# Patient Record
Sex: FEMALE | Race: WHITE | Employment: OTHER | URBAN - METROPOLITAN AREA
[De-identification: names, ages, dates, MRNs, and addresses within clinical notes are randomized per-mention and may not be internally consistent; named-entity substitution may affect disease eponyms.]

---

## 2017-01-10 PROBLEM — M76.30 IT BAND SYNDROME: Status: ACTIVE | Noted: 2017-01-10

## 2017-03-11 PROBLEM — M76.30 IT BAND SYNDROME: Status: RESOLVED | Noted: 2017-01-10 | Resolved: 2017-03-11

## 2018-03-14 PROBLEM — G25.81 RESTLESS LEGS: Status: ACTIVE | Noted: 2018-03-14

## 2018-03-14 PROBLEM — E78.2 HYPERCHOLESTEROLEMIA WITH HYPERTRIGLYCERIDEMIA: Status: ACTIVE | Noted: 2018-03-14

## 2018-12-20 PROBLEM — E16.2 HYPOGLYCEMIA: Status: ACTIVE | Noted: 2018-12-20

## 2019-02-15 PROBLEM — L40.4 GUTTATE PSORIASIS: Status: ACTIVE | Noted: 2019-02-15

## 2019-02-15 PROBLEM — N20.0 CALCIUM OXALATE STONES: Status: ACTIVE | Noted: 2019-02-15

## 2019-05-21 PROBLEM — L93.2 CUTANEOUS LUPUS ERYTHEMATOSUS: Status: ACTIVE | Noted: 2019-05-21

## 2019-05-21 PROBLEM — L40.4 GUTTATE PSORIASIS: Status: RESOLVED | Noted: 2019-02-15 | Resolved: 2019-05-21

## 2019-08-19 PROBLEM — C44.310 BASAL CELL CARCINOMA (BCC) OF SKIN OF FACE: Status: ACTIVE | Noted: 2019-08-19

## 2021-03-15 PROBLEM — N32.81 OAB (OVERACTIVE BLADDER): Status: ACTIVE | Noted: 2021-03-15

## 2022-05-25 PROBLEM — D12.6 ADENOMATOUS POLYP OF COLON: Status: ACTIVE | Noted: 2022-05-25

## 2023-03-27 ENCOUNTER — HOSPITAL ENCOUNTER (OUTPATIENT)
Age: 78
Setting detail: SPECIMEN
Discharge: HOME | End: 2023-03-27

## 2023-03-28 LAB
MICROORGANISM SPEC CULT: NORMAL
SPECIMEN DESCRIPTION: NORMAL

## 2023-05-03 NOTE — PROGRESS NOTES
DAY OF SURGERY/PROCEDURE  GUIDELINES    As a patient at the [facility], you can expect quality medical and nursing care that is centered on your individual needs. It is our goal to make your surgical experience as comfortable and excellent as possible.  ________________________________________________________________________    The following instructions are general guidelines, if any information on this sheet is different from what your doctor has instructed you to do, please follow your doctor's instructions. Please arrive on 5/5 @ 745 am      Enter through entrance C. Check in at registration     Upon arrival you will be taken to the pre-operative area to get ready for surgery, your family will stay in the waiting room and visit with you once you are ready for surgery. Due to special limitations please limit visitation to 1-2 members of your family at a time. When it is time for surgery your family will return to the waiting room. Nothing to eat, drink, smoke, suck or chew after midnight (no water, gum, mints, cigarettes, cigars, pipes, snuff, chewing tobacco, etc.) or your surgery may be canceled. Take a shower or bath on the morning of your surgery/procedure (Hibiclens if directed) Do not apply any lotions. Brush your teeth, but do not swallow any water    IN CASE OF ILLNESS - If you have a cold or flu symptoms (high fever, runny nose, sore throat, cough, etc.) rash, nausea, vomiting, loose stools, and/or recent contact with someone who has a contagious disease (chick pox, measles, etc.) please call your doctor before coming to the surgery center    Take a small sip of water with heart, blood pressure, and/or seizure medication the morning of surgery. DO NOT take anticoagulants (blood thinners, aspirin or aspirin-containing products) as instructed by your physician. Leave all jewelry at home and wear loose, comfortable clothing that is easy to put on and take off.      If

## 2023-05-04 ENCOUNTER — ANESTHESIA EVENT (OUTPATIENT)
Dept: OPERATING ROOM | Age: 78
End: 2023-05-04
Payer: COMMERCIAL

## 2023-05-05 ENCOUNTER — APPOINTMENT (OUTPATIENT)
Dept: GENERAL RADIOLOGY | Age: 78
End: 2023-05-05
Attending: SPECIALIST
Payer: COMMERCIAL

## 2023-05-05 ENCOUNTER — ANESTHESIA (OUTPATIENT)
Dept: OPERATING ROOM | Age: 78
End: 2023-05-05
Payer: COMMERCIAL

## 2023-05-05 ENCOUNTER — HOSPITAL ENCOUNTER (OUTPATIENT)
Age: 78
Setting detail: OUTPATIENT SURGERY
Discharge: HOME | End: 2023-05-05
Attending: SPECIALIST | Admitting: SPECIALIST
Payer: COMMERCIAL

## 2023-05-05 VITALS
HEART RATE: 91 BPM | RESPIRATION RATE: 17 BRPM | WEIGHT: 141.8 LBS | SYSTOLIC BLOOD PRESSURE: 136 MMHG | HEIGHT: 64 IN | DIASTOLIC BLOOD PRESSURE: 76 MMHG | TEMPERATURE: 96.8 F | BODY MASS INDEX: 24.21 KG/M2 | OXYGEN SATURATION: 97 %

## 2023-05-05 PROCEDURE — 6360000002 HC RX W HCPCS: Performed by: SPECIALIST

## 2023-05-05 PROCEDURE — 6360000002 HC RX W HCPCS: Performed by: NURSE ANESTHETIST, CERTIFIED REGISTERED

## 2023-05-05 PROCEDURE — 6370000000 HC RX 637 (ALT 250 FOR IP)

## 2023-05-05 PROCEDURE — 3600000004 HC SURGERY LEVEL 4 BASE: Performed by: SPECIALIST

## 2023-05-05 PROCEDURE — 6360000002 HC RX W HCPCS

## 2023-05-05 PROCEDURE — 7100000001 HC PACU RECOVERY - ADDTL 15 MIN: Performed by: SPECIALIST

## 2023-05-05 PROCEDURE — 7100000000 HC PACU RECOVERY - FIRST 15 MIN: Performed by: SPECIALIST

## 2023-05-05 PROCEDURE — 7100000010 HC PHASE II RECOVERY - FIRST 15 MIN: Performed by: SPECIALIST

## 2023-05-05 PROCEDURE — 2580000003 HC RX 258: Performed by: ANESTHESIOLOGY

## 2023-05-05 PROCEDURE — C2617 STENT, NON-COR, TEM W/O DEL: HCPCS | Performed by: SPECIALIST

## 2023-05-05 PROCEDURE — 3700000000 HC ANESTHESIA ATTENDED CARE: Performed by: SPECIALIST

## 2023-05-05 PROCEDURE — 3600000014 HC SURGERY LEVEL 4 ADDTL 15MIN: Performed by: SPECIALIST

## 2023-05-05 PROCEDURE — 2720000010 HC SURG SUPPLY STERILE: Performed by: SPECIALIST

## 2023-05-05 PROCEDURE — 3209999900 FLUORO FOR SURGICAL PROCEDURES

## 2023-05-05 PROCEDURE — 3700000001 HC ADD 15 MINUTES (ANESTHESIA): Performed by: SPECIALIST

## 2023-05-05 PROCEDURE — 2709999900 HC NON-CHARGEABLE SUPPLY: Performed by: SPECIALIST

## 2023-05-05 PROCEDURE — 7100000011 HC PHASE II RECOVERY - ADDTL 15 MIN: Performed by: SPECIALIST

## 2023-05-05 PROCEDURE — 2500000003 HC RX 250 WO HCPCS: Performed by: NURSE ANESTHETIST, CERTIFIED REGISTERED

## 2023-05-05 DEVICE — URETERAL STENT
Type: IMPLANTABLE DEVICE | Site: URETER | Status: FUNCTIONAL
Brand: POLARIS™ ULTRA

## 2023-05-05 RX ORDER — FENTANYL CITRATE 50 UG/ML
INJECTION, SOLUTION INTRAMUSCULAR; INTRAVENOUS PRN
Status: DISCONTINUED | OUTPATIENT
Start: 2023-05-05 | End: 2023-05-05 | Stop reason: SDUPTHER

## 2023-05-05 RX ORDER — OXYBUTYNIN CHLORIDE 10 MG/1
10 TABLET, EXTENDED RELEASE ORAL 2 TIMES DAILY PRN
Qty: 10 TABLET | Refills: 1 | Status: SHIPPED | OUTPATIENT
Start: 2023-05-05 | End: 2023-05-11

## 2023-05-05 RX ORDER — SODIUM CHLORIDE 9 MG/ML
25 INJECTION, SOLUTION INTRAVENOUS PRN
Status: DISCONTINUED | OUTPATIENT
Start: 2023-05-05 | End: 2023-05-05 | Stop reason: HOSPADM

## 2023-05-05 RX ORDER — DEXAMETHASONE SODIUM PHOSPHATE 10 MG/ML
INJECTION, SOLUTION INTRAMUSCULAR; INTRAVENOUS PRN
Status: DISCONTINUED | OUTPATIENT
Start: 2023-05-05 | End: 2023-05-05 | Stop reason: SDUPTHER

## 2023-05-05 RX ORDER — METOCLOPRAMIDE HYDROCHLORIDE 5 MG/ML
10 INJECTION INTRAMUSCULAR; INTRAVENOUS
Status: DISCONTINUED | OUTPATIENT
Start: 2023-05-05 | End: 2023-05-05 | Stop reason: HOSPADM

## 2023-05-05 RX ORDER — SODIUM CHLORIDE, SODIUM LACTATE, POTASSIUM CHLORIDE, CALCIUM CHLORIDE 600; 310; 30; 20 MG/100ML; MG/100ML; MG/100ML; MG/100ML
INJECTION, SOLUTION INTRAVENOUS CONTINUOUS
Status: DISCONTINUED | OUTPATIENT
Start: 2023-05-05 | End: 2023-05-05 | Stop reason: HOSPADM

## 2023-05-05 RX ORDER — OXYCODONE HYDROCHLORIDE 5 MG/1
10 TABLET ORAL PRN
Status: DISCONTINUED | OUTPATIENT
Start: 2023-05-05 | End: 2023-05-05 | Stop reason: HOSPADM

## 2023-05-05 RX ORDER — ONDANSETRON 2 MG/ML
4 INJECTION INTRAMUSCULAR; INTRAVENOUS
Status: COMPLETED | OUTPATIENT
Start: 2023-05-05 | End: 2023-05-05

## 2023-05-05 RX ORDER — GENTAMICIN SULFATE 80 MG/50ML
80 INJECTION, SOLUTION INTRAVENOUS ONCE
Status: COMPLETED | OUTPATIENT
Start: 2023-05-05 | End: 2023-05-05

## 2023-05-05 RX ORDER — SODIUM CHLORIDE 9 MG/ML
INJECTION, SOLUTION INTRAVENOUS PRN
Status: DISCONTINUED | OUTPATIENT
Start: 2023-05-05 | End: 2023-05-05 | Stop reason: HOSPADM

## 2023-05-05 RX ORDER — HYDRALAZINE HYDROCHLORIDE 20 MG/ML
10 INJECTION INTRAMUSCULAR; INTRAVENOUS
Status: DISCONTINUED | OUTPATIENT
Start: 2023-05-05 | End: 2023-05-05 | Stop reason: HOSPADM

## 2023-05-05 RX ORDER — DIPHENHYDRAMINE HYDROCHLORIDE 50 MG/ML
12.5 INJECTION INTRAMUSCULAR; INTRAVENOUS
Status: DISCONTINUED | OUTPATIENT
Start: 2023-05-05 | End: 2023-05-05 | Stop reason: HOSPADM

## 2023-05-05 RX ORDER — SODIUM CHLORIDE 0.9 % (FLUSH) 0.9 %
5-40 SYRINGE (ML) INJECTION EVERY 12 HOURS SCHEDULED
Status: DISCONTINUED | OUTPATIENT
Start: 2023-05-05 | End: 2023-05-05 | Stop reason: HOSPADM

## 2023-05-05 RX ORDER — SODIUM CHLORIDE 0.9 % (FLUSH) 0.9 %
5-40 SYRINGE (ML) INJECTION PRN
Status: DISCONTINUED | OUTPATIENT
Start: 2023-05-05 | End: 2023-05-05 | Stop reason: HOSPADM

## 2023-05-05 RX ORDER — OXYCODONE HYDROCHLORIDE 5 MG/1
5 TABLET ORAL PRN
Status: DISCONTINUED | OUTPATIENT
Start: 2023-05-05 | End: 2023-05-05 | Stop reason: HOSPADM

## 2023-05-05 RX ORDER — PROPOFOL 10 MG/ML
INJECTION, EMULSION INTRAVENOUS PRN
Status: DISCONTINUED | OUTPATIENT
Start: 2023-05-05 | End: 2023-05-05 | Stop reason: SDUPTHER

## 2023-05-05 RX ORDER — ONDANSETRON 2 MG/ML
INJECTION INTRAMUSCULAR; INTRAVENOUS PRN
Status: DISCONTINUED | OUTPATIENT
Start: 2023-05-05 | End: 2023-05-05 | Stop reason: SDUPTHER

## 2023-05-05 RX ORDER — ONDANSETRON 2 MG/ML
INJECTION INTRAMUSCULAR; INTRAVENOUS
Status: COMPLETED
Start: 2023-05-05 | End: 2023-05-05

## 2023-05-05 RX ORDER — LIDOCAINE HYDROCHLORIDE 10 MG/ML
1 INJECTION, SOLUTION INFILTRATION; PERINEURAL
Status: DISCONTINUED | OUTPATIENT
Start: 2023-05-05 | End: 2023-05-05 | Stop reason: HOSPADM

## 2023-05-05 RX ORDER — GENTAMICIN SULFATE 80 MG/50ML
INJECTION, SOLUTION INTRAVENOUS
Status: COMPLETED
Start: 2023-05-05 | End: 2023-05-05

## 2023-05-05 RX ORDER — PHENYLEPHRINE HCL IN 0.9% NACL 1 MG/10 ML
SYRINGE (ML) INTRAVENOUS PRN
Status: DISCONTINUED | OUTPATIENT
Start: 2023-05-05 | End: 2023-05-05 | Stop reason: SDUPTHER

## 2023-05-05 RX ORDER — KETOROLAC TROMETHAMINE 30 MG/ML
INJECTION, SOLUTION INTRAMUSCULAR; INTRAVENOUS PRN
Status: DISCONTINUED | OUTPATIENT
Start: 2023-05-05 | End: 2023-05-05 | Stop reason: SDUPTHER

## 2023-05-05 RX ADMIN — GENTAMICIN SULFATE 80 MG: 80 INJECTION, SOLUTION INTRAVENOUS at 09:20

## 2023-05-05 RX ADMIN — ONDANSETRON 4 MG: 2 INJECTION INTRAMUSCULAR; INTRAVENOUS at 09:22

## 2023-05-05 RX ADMIN — ONDANSETRON 4 MG: 2 INJECTION INTRAMUSCULAR; INTRAVENOUS at 11:16

## 2023-05-05 RX ADMIN — KETOROLAC TROMETHAMINE 15 MG: 30 INJECTION, SOLUTION INTRAMUSCULAR at 10:01

## 2023-05-05 RX ADMIN — HYOSCYAMINE SULFATE 125 MCG: 0.12 TABLET ORAL at 11:28

## 2023-05-05 RX ADMIN — PROPOFOL 100 MG: 10 INJECTION, EMULSION INTRAVENOUS at 09:18

## 2023-05-05 RX ADMIN — FENTANYL CITRATE 50 MCG: 50 INJECTION, SOLUTION INTRAMUSCULAR; INTRAVENOUS at 09:18

## 2023-05-05 RX ADMIN — FENTANYL CITRATE 50 MCG: 50 INJECTION, SOLUTION INTRAMUSCULAR; INTRAVENOUS at 09:24

## 2023-05-05 RX ADMIN — Medication 50 MCG: at 09:30

## 2023-05-05 RX ADMIN — FENTANYL CITRATE 50 MCG: 50 INJECTION, SOLUTION INTRAMUSCULAR; INTRAVENOUS at 10:03

## 2023-05-05 RX ADMIN — SODIUM CHLORIDE, POTASSIUM CHLORIDE, SODIUM LACTATE AND CALCIUM CHLORIDE: 600; 310; 30; 20 INJECTION, SOLUTION INTRAVENOUS at 08:31

## 2023-05-05 RX ADMIN — DEXAMETHASONE SODIUM PHOSPHATE 5 MG: 10 INJECTION, SOLUTION INTRAMUSCULAR; INTRAVENOUS at 09:22

## 2023-05-05 RX ADMIN — Medication 125 MCG: at 11:28

## 2023-05-05 RX ADMIN — PROPOFOL 50 MG: 10 INJECTION, EMULSION INTRAVENOUS at 09:22

## 2023-05-05 RX ADMIN — Medication 100 MCG: at 09:32

## 2023-05-05 RX ADMIN — FENTANYL CITRATE 50 MCG: 50 INJECTION, SOLUTION INTRAMUSCULAR; INTRAVENOUS at 10:12

## 2023-05-05 ASSESSMENT — PAIN SCALES - GENERAL
PAINLEVEL_OUTOF10: 0
PAINLEVEL_OUTOF10: 3
PAINLEVEL_OUTOF10: 0
PAINLEVEL_OUTOF10: 5

## 2023-05-05 ASSESSMENT — PAIN - FUNCTIONAL ASSESSMENT: PAIN_FUNCTIONAL_ASSESSMENT: 0-10

## 2023-05-05 NOTE — DISCHARGE INSTRUCTIONS
Activity  You have had anesthesia today  Do not drive, operate heavy equipment, consume alcoholic beverages, or make any important decisions  for 24 hours   If you are taking pain medication: Do not drive or consume alcohol. Take your time changing positions today. You may feel light headed or dizzy if you move too quickly. Continue your home medications as ordered by your physician. Diet   You can eat your normal diet when you feel well. You should start off with bland foods like chicken soup, toast, or yogurt. Then advance as tolerated. Drink plenty of fluids (unless your doctor tells you not to). Your urine should be very lightly colored without a strong odor. Remove stent with string in 5 days. Get a 24 hour urine collection to determine etiology of stone disease in next 2-3 weeks; Dr. Joanne Jerome office will send information about this to you directly. Follow up in Tete Chavez M.D.'s office in 6 weeks with DAREK prior. Patient instructed to drink at least 10 eight ounce glasses of water a day to facilitate passage of any stones. Expect to see intermittent visible blood in urine as long as stent is in place. You may experience increased urgency and frequency of urination and pain (especially at the end of urination) associated with the stent. Take the prescribed medications to help alleviate these symptoms.

## 2023-05-05 NOTE — H&P
Determinants of Health     Financial Resource Strain: Low Risk     Difficulty of Paying Living Expenses: Not hard at all   Food Insecurity: No Food Insecurity    Worried About [address] in the Last Year: Never true    Ran Out of Food in the Last Year: Never true   Transportation Needs: Unknown    Lack of Transportation (Medical): Not on file    Lack of Transportation (Non-Medical): No   Physical Activity: Not on file   Stress: Not on file   Social Connections: Not on file   Intimate Partner Violence: Not on file   Housing Stability: Unknown    Unable to Pay for Housing in the Last Year: Not on file    Number of Places Lived in the Last Year: Not on file    Unstable Housing in the Last Year: No       Family History:    Family History   Problem Relation Age of Onset    Dementia Mother     High Blood Pressure Mother     Stroke Mother     Cancer Father         prostate/bone    Urolithiasis Father     Cancer Brother         bladder    Arthritis Brother     Other Brother         cornea problems. Dementia Maternal Aunt     Heart Disease Paternal Uncle 36        mi    Dementia Maternal Grandmother     Heart Disease Maternal Grandfather 66         mi    Other Son         nephrolithiasis/uric acid    High Cholesterol Daughter        REVIEW OF SYSTEMS:  All reviewed and negative except for pertinent ones listed in HPI. Physical Exam:      No data found. Constitutional: Patient in no acute distress; Neuro: alert and oriented to person place and time. Psych: Mood and affect normal.  Skin: Normal  Lungs: Respiratory effort normal, CTA  Cardiovascular:  Normal peripheral pulses; R3 wo murmur  Abdomen: Soft, non-tender, non-distended with no CVA, flank pain    LABS:   No results for input(s): WBC, HGB, HCT, MCV, PLT in the last 72 hours. No results for input(s): NA, K, CL, CO2, PHOS, BUN, CREATININE, CA in the last 72 hours.   No results found for: PSA    Additional Lab/culture results:    Urinalysis:

## 2023-05-05 NOTE — OP NOTE
Operative Note      Patient: Kim Corral  YOB: 1945  MRN: [id number]    Date of Procedure: 5/5/2023    Pre-Op Diagnosis Codes:     * Left ureteral calculus [N20.1]    Post-Op Diagnosis: Same       Procedure(s):  CYSTOSCOPY LEFT URETEROSCOPY FORTEC HOLMIUM LASER AND LEFT STENT PLACEMENT [id number]    Surgeon(s):  Corona Guthrie MD    Assistant:   * No surgical staff found *    Anesthesia: General    Estimated Blood Loss (mL): Minimal    Complications: None    Specimens:   * No specimens in log *    Implants:  * No implants in log *      Drains: * No LDAs found *    Findings: Patient's proximal ureteral and left renal stones broke up nicely. Detailed Description of Procedure:   INDICATIONS FOR PROCEDURE:  Kim Corral is a 70 y.o. female presents for a left sided ureteral calculus. After the risks, benefits, alternatives, of the procedure were discussed with the patient, informed consent was obtained. The patient elected to proceed. Patient given Gentamicin 80 mg IV on call to OR. DETAILS OF THE PROCEDURE:  The patient was brought back from the preoperative holding area to the  operating suite, and was transferred to the operating table where the patient lay in  supine position. EPC's were in place, connected to the machine and the machine was turned on before induction. General endotracheal anesthesia was induced, and patient was prepped and draped in standard surgical fashion after being placed in dorsolithotomy position. A proper timeout was performed, preoperative antibiotics were given. We began by inserting a cystoscope with a 22 French sheath and 30 degree lens into the patient's urethral meatus and advancing into the bladder without complication. A pan cystoscopy was preformed and the bladder appeared unremarkable. We then focused our attention on the Left ureteral orifice, which we cannulated with our glidewire, advanced up to renal pelvis.   We then used a dual

## 2023-05-05 NOTE — ANESTHESIA PRE PROCEDURE
Department of Anesthesiology  Preprocedure Note       Name:  Abdirashid Rodriguez   Age:  70 y.o.  :  1945                                          MRN:  [id number]         Date:  2023      Surgeon: Celi Taylor):  Liisa Nageotte, MD    Procedure: Procedure(s):  CYSTOSCOPY LEFT URETEROSCOPY FORTEC HOLMIUM LASER AND LEFT STENT PLACEMENT [id number]    Medications prior to admission:   Prior to Admission medications    Medication Sig Start Date End Date Taking? Authorizing Provider   ondansetron (ZOFRAN-ODT) 4 MG disintegrating tablet  5/3/23   Historical Provider, MD   tamsulosin (FLOMAX) 0.4 MG capsule  5/3/23   Historical Provider, MD   ketorolac (TORADOL) 10 MG tablet Take 1 tablet by mouth every 6 hours as needed 23   Historical Provider, MD   lisinopril (PRINIVIL;ZESTRIL) 5 MG tablet Take 1 tablet by mouth daily 23   Char Richard MD   TOVIAZ 8 MG TB24 Take 8 mg by mouth daily 23   Liisa Nageotte, MD   pravastatin (PRAVACHOL) 80 MG tablet TAKE 1 TABLET BY MOUTH EVERY DAY 3/6/23   Char Richard MD   linaclotide [facility]) 145 MCG capsule Take 1 capsule by mouth every morning (before breakfast) 22   Char Richard MD   ammonium lactate (LAC-HYDRIN) 12 % lotion APPLY ONCE DAILY, NECK DOWN, AFTER BATHING,  FOR DRY SKIN 22   Historical Provider, MD   Denosumab (PROLIA SC) Inject into the skin    Historical Provider, MD   Timolol (TIMOPTIC) 0.5 % (DAILY) SOLN ophthalmic solution 1 drop  in the morning and 1 drop in the evening. Indications: both eye.     Historical Provider, MD   Multiple Vitamins-Minerals (ONE-A-DAY WOMENS 50 PLUS PO) Take by mouth     Historical Provider, MD   Omega-3 Fatty Acids (FISH OIL) 1000 MG CPDR Take 1 capsule by mouth daily    Historical Provider, MD   VYZULTA 0.024 % SOLN Place 1 drop into both eyes nightly  3/22/19   Historical Provider, MD   Coenzyme Q10 (COQ10) 200 MG CAPS Take 1 capsule by mouth daily 16   Ray Antunez

## 2023-05-05 NOTE — ANESTHESIA POSTPROCEDURE EVALUATION
POST- ANESTHESIA EVALUATION       Pt Name: Abdirashid Rodriguez  MRN: [id number]  [place]: 1945  Date of evaluation: 5/5/2023  Time:  1:33 PM      /76   Pulse 91   Temp 96.8 °F (36 °C) (Temporal)   Resp 17   Ht 5' 4\" (1.626 m)   Wt 141 lb 12.8 oz (64.3 kg)   SpO2 97%   BMI 24.34 kg/m²      Consciousness Level  Awake  Cardiopulmonary Status  Stable  Pain Adequately Treated YES  Nausea / Vomiting  NO  Adequate Hydration  YES  Anesthesia Related Complications NONE      Electronically signed by Idalmis Harrington MD on 5/5/2023 at 1:33 PM       Department of Anesthesiology  Postprocedure Note    Patient: Abdirashid Rodriguez  MRN: [id number]  [place]: 1945  Date of evaluation: 5/5/2023      Procedure Summary     Date: 05/05/23 Room / Location: [address]    Anesthesia Start: 1970 Anesthesia Stop: 6781    Procedure: CYSTOSCOPY LEFT URETEROSCOPY FORTEC HOLMIUM LASER AND LEFT STENT PLACEMENT [id number] (Left) Diagnosis:       Left ureteral calculus      (Left ureteral calculus [N20.1])    Surgeons: Liisa Nageotte, MD Responsible Provider: Idalmis Harrington MD    Anesthesia Type: general ASA Status: 3          Anesthesia Type: No value filed.     Tony Phase I: Tony Score: 10    Tony Phase II:        Anesthesia Post Evaluation

## 2023-05-18 ENCOUNTER — ANESTHESIA EVENT (OUTPATIENT)
Dept: OPERATING ROOM | Age: 78
End: 2023-05-18
Payer: COMMERCIAL

## 2023-05-19 ENCOUNTER — ANESTHESIA (OUTPATIENT)
Dept: OPERATING ROOM | Age: 78
End: 2023-05-19
Payer: COMMERCIAL

## 2023-05-19 ENCOUNTER — APPOINTMENT (OUTPATIENT)
Dept: GENERAL RADIOLOGY | Age: 78
End: 2023-05-19
Attending: SPECIALIST
Payer: COMMERCIAL

## 2023-05-19 ENCOUNTER — HOSPITAL ENCOUNTER (OUTPATIENT)
Age: 78
Setting detail: OUTPATIENT SURGERY
Discharge: HOME | End: 2023-05-19
Attending: SPECIALIST | Admitting: SPECIALIST
Payer: COMMERCIAL

## 2023-05-19 VITALS
BODY MASS INDEX: 23.22 KG/M2 | SYSTOLIC BLOOD PRESSURE: 137 MMHG | WEIGHT: 136 LBS | TEMPERATURE: 97.3 F | HEIGHT: 64 IN | RESPIRATION RATE: 18 BRPM | OXYGEN SATURATION: 98 % | HEART RATE: 66 BPM | DIASTOLIC BLOOD PRESSURE: 66 MMHG

## 2023-05-19 PROCEDURE — C2617 STENT, NON-COR, TEM W/O DEL: HCPCS | Performed by: SPECIALIST

## 2023-05-19 PROCEDURE — C1758 CATHETER, URETERAL: HCPCS | Performed by: SPECIALIST

## 2023-05-19 PROCEDURE — 3209999900 FLUORO FOR SURGICAL PROCEDURES

## 2023-05-19 PROCEDURE — 2580000003 HC RX 258: Performed by: SPECIALIST

## 2023-05-19 PROCEDURE — 3600000013 HC SURGERY LEVEL 3 ADDTL 15MIN: Performed by: SPECIALIST

## 2023-05-19 PROCEDURE — 2580000003 HC RX 258: Performed by: ANESTHESIOLOGY

## 2023-05-19 PROCEDURE — 2709999900 HC NON-CHARGEABLE SUPPLY: Performed by: SPECIALIST

## 2023-05-19 PROCEDURE — 3600000003 HC SURGERY LEVEL 3 BASE: Performed by: SPECIALIST

## 2023-05-19 PROCEDURE — 3700000000 HC ANESTHESIA ATTENDED CARE: Performed by: SPECIALIST

## 2023-05-19 PROCEDURE — 2500000003 HC RX 250 WO HCPCS: Performed by: NURSE ANESTHETIST, CERTIFIED REGISTERED

## 2023-05-19 PROCEDURE — 6370000000 HC RX 637 (ALT 250 FOR IP)

## 2023-05-19 PROCEDURE — 7100000010 HC PHASE II RECOVERY - FIRST 15 MIN: Performed by: SPECIALIST

## 2023-05-19 PROCEDURE — 3700000001 HC ADD 15 MINUTES (ANESTHESIA): Performed by: SPECIALIST

## 2023-05-19 PROCEDURE — C1769 GUIDE WIRE: HCPCS | Performed by: SPECIALIST

## 2023-05-19 PROCEDURE — 7100000011 HC PHASE II RECOVERY - ADDTL 15 MIN: Performed by: SPECIALIST

## 2023-05-19 PROCEDURE — 6360000002 HC RX W HCPCS: Performed by: SPECIALIST

## 2023-05-19 PROCEDURE — 6360000002 HC RX W HCPCS: Performed by: NURSE ANESTHETIST, CERTIFIED REGISTERED

## 2023-05-19 DEVICE — URETERAL STENT
Type: IMPLANTABLE DEVICE | Status: FUNCTIONAL
Brand: POLARIS™ ULTRA

## 2023-05-19 RX ORDER — MORPHINE SULFATE 2 MG/ML
2 INJECTION, SOLUTION INTRAMUSCULAR; INTRAVENOUS EVERY 5 MIN PRN
Status: DISCONTINUED | OUTPATIENT
Start: 2023-05-19 | End: 2023-05-19 | Stop reason: HOSPADM

## 2023-05-19 RX ORDER — SODIUM CHLORIDE 9 MG/ML
25 INJECTION, SOLUTION INTRAVENOUS PRN
Status: DISCONTINUED | OUTPATIENT
Start: 2023-05-19 | End: 2023-05-19 | Stop reason: HOSPADM

## 2023-05-19 RX ORDER — ONDANSETRON 4 MG/1
TABLET, ORALLY DISINTEGRATING ORAL
Status: COMPLETED
Start: 2023-05-19 | End: 2023-05-19

## 2023-05-19 RX ORDER — MIDAZOLAM HYDROCHLORIDE 2 MG/2ML
2 INJECTION, SOLUTION INTRAMUSCULAR; INTRAVENOUS
Status: DISCONTINUED | OUTPATIENT
Start: 2023-05-19 | End: 2023-05-19 | Stop reason: HOSPADM

## 2023-05-19 RX ORDER — PROPOFOL 10 MG/ML
INJECTION, EMULSION INTRAVENOUS PRN
Status: DISCONTINUED | OUTPATIENT
Start: 2023-05-19 | End: 2023-05-19 | Stop reason: SDUPTHER

## 2023-05-19 RX ORDER — HYDRALAZINE HYDROCHLORIDE 20 MG/ML
10 INJECTION INTRAMUSCULAR; INTRAVENOUS
Status: DISCONTINUED | OUTPATIENT
Start: 2023-05-19 | End: 2023-05-19 | Stop reason: HOSPADM

## 2023-05-19 RX ORDER — GLYCOPYRROLATE 0.2 MG/ML
0.4 INJECTION INTRAMUSCULAR; INTRAVENOUS ONCE
Status: DISCONTINUED | OUTPATIENT
Start: 2023-05-19 | End: 2023-05-19 | Stop reason: HOSPADM

## 2023-05-19 RX ORDER — DIPHENHYDRAMINE HYDROCHLORIDE 50 MG/ML
12.5 INJECTION INTRAMUSCULAR; INTRAVENOUS
Status: DISCONTINUED | OUTPATIENT
Start: 2023-05-19 | End: 2023-05-19 | Stop reason: HOSPADM

## 2023-05-19 RX ORDER — DEXAMETHASONE SODIUM PHOSPHATE 10 MG/ML
INJECTION, SOLUTION INTRAMUSCULAR; INTRAVENOUS PRN
Status: DISCONTINUED | OUTPATIENT
Start: 2023-05-19 | End: 2023-05-19 | Stop reason: SDUPTHER

## 2023-05-19 RX ORDER — IPRATROPIUM BROMIDE AND ALBUTEROL SULFATE 2.5; .5 MG/3ML; MG/3ML
1 SOLUTION RESPIRATORY (INHALATION)
Status: DISCONTINUED | OUTPATIENT
Start: 2023-05-19 | End: 2023-05-19 | Stop reason: HOSPADM

## 2023-05-19 RX ORDER — CEFAZOLIN 2 G/1
INJECTION, POWDER, FOR SOLUTION INTRAMUSCULAR; INTRAVENOUS
Status: DISCONTINUED
Start: 2023-05-19 | End: 2023-05-19 | Stop reason: HOSPADM

## 2023-05-19 RX ORDER — OXYCODONE HYDROCHLORIDE AND ACETAMINOPHEN 5; 325 MG/1; MG/1
2 TABLET ORAL
Status: DISCONTINUED | OUTPATIENT
Start: 2023-05-19 | End: 2023-05-19 | Stop reason: HOSPADM

## 2023-05-19 RX ORDER — KETOROLAC TROMETHAMINE 30 MG/ML
INJECTION, SOLUTION INTRAMUSCULAR; INTRAVENOUS PRN
Status: DISCONTINUED | OUTPATIENT
Start: 2023-05-19 | End: 2023-05-19 | Stop reason: SDUPTHER

## 2023-05-19 RX ORDER — LABETALOL HYDROCHLORIDE 5 MG/ML
10 INJECTION, SOLUTION INTRAVENOUS
Status: DISCONTINUED | OUTPATIENT
Start: 2023-05-19 | End: 2023-05-19 | Stop reason: HOSPADM

## 2023-05-19 RX ORDER — SODIUM CHLORIDE 0.9 % (FLUSH) 0.9 %
5-40 SYRINGE (ML) INJECTION PRN
Status: DISCONTINUED | OUTPATIENT
Start: 2023-05-19 | End: 2023-05-19 | Stop reason: HOSPADM

## 2023-05-19 RX ORDER — ONDANSETRON 2 MG/ML
INJECTION INTRAMUSCULAR; INTRAVENOUS PRN
Status: DISCONTINUED | OUTPATIENT
Start: 2023-05-19 | End: 2023-05-19 | Stop reason: SDUPTHER

## 2023-05-19 RX ORDER — OXYCODONE HYDROCHLORIDE AND ACETAMINOPHEN 5; 325 MG/1; MG/1
1 TABLET ORAL
Status: DISCONTINUED | OUTPATIENT
Start: 2023-05-19 | End: 2023-05-19 | Stop reason: HOSPADM

## 2023-05-19 RX ORDER — ONDANSETRON 2 MG/ML
4 INJECTION INTRAMUSCULAR; INTRAVENOUS
Status: DISCONTINUED | OUTPATIENT
Start: 2023-05-19 | End: 2023-05-19 | Stop reason: ALTCHOICE

## 2023-05-19 RX ORDER — SODIUM CHLORIDE 0.9 % (FLUSH) 0.9 %
5-40 SYRINGE (ML) INJECTION EVERY 12 HOURS SCHEDULED
Status: DISCONTINUED | OUTPATIENT
Start: 2023-05-19 | End: 2023-05-19 | Stop reason: HOSPADM

## 2023-05-19 RX ORDER — LIDOCAINE HYDROCHLORIDE 10 MG/ML
INJECTION, SOLUTION INFILTRATION; PERINEURAL PRN
Status: DISCONTINUED | OUTPATIENT
Start: 2023-05-19 | End: 2023-05-19 | Stop reason: SDUPTHER

## 2023-05-19 RX ORDER — FENTANYL CITRATE 50 UG/ML
INJECTION, SOLUTION INTRAMUSCULAR; INTRAVENOUS PRN
Status: DISCONTINUED | OUTPATIENT
Start: 2023-05-19 | End: 2023-05-19 | Stop reason: SDUPTHER

## 2023-05-19 RX ORDER — PROMETHAZINE HYDROCHLORIDE 25 MG/ML
6.25 INJECTION, SOLUTION INTRAMUSCULAR; INTRAVENOUS EVERY 5 MIN PRN
Status: DISCONTINUED | OUTPATIENT
Start: 2023-05-19 | End: 2023-05-19 | Stop reason: HOSPADM

## 2023-05-19 RX ORDER — DROPERIDOL 2.5 MG/ML
0.62 INJECTION, SOLUTION INTRAMUSCULAR; INTRAVENOUS
Status: DISCONTINUED | OUTPATIENT
Start: 2023-05-19 | End: 2023-05-19 | Stop reason: HOSPADM

## 2023-05-19 RX ORDER — CEPHALEXIN 500 MG/1
500 CAPSULE ORAL 3 TIMES DAILY
Qty: 9 CAPSULE | Refills: 0 | Status: SHIPPED | OUTPATIENT
Start: 2023-05-19

## 2023-05-19 RX ORDER — SODIUM CHLORIDE 9 MG/ML
INJECTION, SOLUTION INTRAVENOUS PRN
Status: DISCONTINUED | OUTPATIENT
Start: 2023-05-19 | End: 2023-05-19 | Stop reason: HOSPADM

## 2023-05-19 RX ORDER — LIDOCAINE HYDROCHLORIDE 10 MG/ML
1 INJECTION, SOLUTION INFILTRATION; PERINEURAL
Status: DISCONTINUED | OUTPATIENT
Start: 2023-05-19 | End: 2023-05-19 | Stop reason: HOSPADM

## 2023-05-19 RX ORDER — ONDANSETRON 4 MG/1
4 TABLET, ORALLY DISINTEGRATING ORAL EVERY 8 HOURS PRN
Status: DISCONTINUED | OUTPATIENT
Start: 2023-05-19 | End: 2023-05-19 | Stop reason: HOSPADM

## 2023-05-19 RX ORDER — SODIUM CHLORIDE, SODIUM LACTATE, POTASSIUM CHLORIDE, CALCIUM CHLORIDE 600; 310; 30; 20 MG/100ML; MG/100ML; MG/100ML; MG/100ML
INJECTION, SOLUTION INTRAVENOUS CONTINUOUS
Status: DISCONTINUED | OUTPATIENT
Start: 2023-05-19 | End: 2023-05-19 | Stop reason: HOSPADM

## 2023-05-19 RX ORDER — MEPERIDINE HYDROCHLORIDE 50 MG/ML
12.5 INJECTION INTRAMUSCULAR; INTRAVENOUS; SUBCUTANEOUS EVERY 5 MIN PRN
Status: DISCONTINUED | OUTPATIENT
Start: 2023-05-19 | End: 2023-05-19 | Stop reason: HOSPADM

## 2023-05-19 RX ADMIN — ONDANSETRON 4 MG: 2 INJECTION INTRAMUSCULAR; INTRAVENOUS at 14:16

## 2023-05-19 RX ADMIN — SODIUM CHLORIDE, POTASSIUM CHLORIDE, SODIUM LACTATE AND CALCIUM CHLORIDE: 600; 310; 30; 20 INJECTION, SOLUTION INTRAVENOUS at 13:57

## 2023-05-19 RX ADMIN — ONDANSETRON 4 MG: 4 TABLET, ORALLY DISINTEGRATING ORAL at 15:30

## 2023-05-19 RX ADMIN — CEFAZOLIN 2000 MG: 2 INJECTION, POWDER, FOR SOLUTION INTRAMUSCULAR; INTRAVENOUS at 14:01

## 2023-05-19 RX ADMIN — FENTANYL CITRATE 25 MCG: 50 INJECTION, SOLUTION INTRAMUSCULAR; INTRAVENOUS at 14:07

## 2023-05-19 RX ADMIN — LIDOCAINE HYDROCHLORIDE 40 MG: 10 INJECTION, SOLUTION INFILTRATION; PERINEURAL at 13:57

## 2023-05-19 RX ADMIN — FENTANYL CITRATE 50 MCG: 50 INJECTION, SOLUTION INTRAMUSCULAR; INTRAVENOUS at 14:01

## 2023-05-19 RX ADMIN — FENTANYL CITRATE 25 MCG: 50 INJECTION, SOLUTION INTRAMUSCULAR; INTRAVENOUS at 14:21

## 2023-05-19 RX ADMIN — DEXAMETHASONE SODIUM PHOSPHATE 10 MG: 10 INJECTION INTRAMUSCULAR; INTRAVENOUS at 14:07

## 2023-05-19 RX ADMIN — PROPOFOL 180 MG: 10 INJECTION, EMULSION INTRAVENOUS at 13:57

## 2023-05-19 RX ADMIN — KETOROLAC TROMETHAMINE 15 MG: 30 INJECTION, SOLUTION INTRAMUSCULAR; INTRAVENOUS at 14:16

## 2023-05-19 ASSESSMENT — PAIN - FUNCTIONAL ASSESSMENT: PAIN_FUNCTIONAL_ASSESSMENT: NONE - DENIES PAIN

## 2023-05-19 NOTE — DISCHARGE INSTRUCTIONS
Remove stent with string in 3 days. Get a 24 hour urine collection to determine etiology of stone disease in next 2-3 weeks; Dr. Zack Khan office will send information about this to you directly. Follow up in Rupa Hyde M.D.'s office in 6 weeks with KUB prior. Patient instructed to drink at least 10 eight ounce glasses of water a day to facilitate passage of any stones. Expect to see intermittent visible blood in urine as long as stent is in place. You may experience increased urgency and frequency of urination and pain (especially at the end of urination) associated with the stent. Take the prescribed medications to help alleviate these symptoms.

## 2023-05-19 NOTE — OP NOTE
Operative Note      Patient: Kassidy Oropeza  YOB: 1945  MRN: [id number]    Date of Procedure: 5/19/2023    Pre-Op Diagnosis Codes:     * Left ureteral calculus [N20.1]    Post-Op Diagnosis: Same       Procedure(s):  CYSTOSCOPY LEFT URETEROSCOPY HOLMIUM LASER WITH LEFT STENT PLACEMENT    Surgeon(s):  Jocelyn Coto MD    Assistant:   * No surgical staff found *    Anesthesia: General    Estimated Blood Loss (mL): Minimal    Complications: None    Specimens:   * No specimens in log *    Implants:  * No surgical log found *      Drains:   [REMOVED] Ureteral Drain/Stent 05/05/23 (Removed)   Site Assessment Clean, dry & intact 05/05/23 1010       Findings: see below       Detailed Description of Procedure:   INDICATIONS FOR PROCEDURE:  Kassidy Oropeza is a 70 y.o. female presents for Left sided distal ureteral calculus. After the risks, benefits, alternatives, of the procedure were discussed with the patient, informed consent was obtained. The patient elected to proceed. Patient given Ancef 2 gm IV on call to OR. DETAILS OF THE PROCEDURE:  The patient was brought back from the preoperative holding area to the  operating suite, and was transferred to the operating table where the patient lay in  supine position. EPC's were in place, connected to the machine and the machine was turned on before induction. General endotracheal anesthesia was induced, and patient was prepped and draped in standard surgical fashion after being placed in dorsolithotomy position. A proper timeout was performed, preoperative antibiotics were given. We began by inserting a cystoscope with a 22 French sheath and 30 degree lens into the patient's urethral meatus and advancing into the bladder without complication. A pan cystoscopy was preformed and the bladder appeared unremarkable.   We then focused our attention on the Left ureteral orifice, and the previous stent was removed with grasping forceps and brought

## 2023-05-19 NOTE — ANESTHESIA PRE PROCEDURE
Department of Anesthesiology  Preprocedure Note       Name:  Daniel Mcguire   Age:  70 y.o.  :  1945                                          MRN:  [id number]         Date:  2023      Surgeon: Cyndi Hood):  Ameya Saini MD    Procedure: Procedure(s):  CYSTOSCOPY LEFT URETEROSCOPY HOLMIUM LASER WITH LEFT STENT PLACEMENT    Medications prior to admission:   Prior to Admission medications    Medication Sig Start Date End Date Taking? Authorizing Provider   tamsulosin (FLOMAX) 0.4 MG capsule Take 1 capsule by mouth daily Take one capsule daily to facilitate passage of ureteral stone 23   Ameya Saini MD   ondansetron (ZOFRAN-ODT) 4 MG disintegrating tablet  5/3/23   Historical Provider, MD   tamsulosin (FLOMAX) 0.4 MG capsule  5/3/23   Historical Provider, MD   ketorolac (TORADOL) 10 MG tablet Take 1 tablet by mouth every 6 hours as needed 23   Historical Provider, MD   lisinopril (PRINIVIL;ZESTRIL) 5 MG tablet Take 1 tablet by mouth daily 23   Ora Vasquez MD   TOVIAZ 8 MG TB24 Take 8 mg by mouth daily 23   Ameya Saini MD   pravastatin (PRAVACHOL) 80 MG tablet TAKE 1 TABLET BY MOUTH EVERY DAY 3/6/23   Ora Vasquez MD   linaclotide [facility]) 145 MCG capsule Take 1 capsule by mouth every morning (before breakfast) 22   Ora Vasquez MD   ammonium lactate (LAC-HYDRIN) 12 % lotion APPLY ONCE DAILY, NECK DOWN, AFTER BATHING,  FOR DRY SKIN 22   Historical Provider, MD   Denosumab (PROLIA SC) Inject into the skin  Patient not taking: Reported on 2023    Historical Provider, MD   Timolol (TIMOPTIC) 0.5 % (DAILY) SOLN ophthalmic solution 1 drop  in the morning and 1 drop in the evening. Indications: both eye.     Historical Provider, MD   Multiple Vitamins-Minerals (ONE-A-DAY WOMENS 50 PLUS PO) Take by mouth     Historical Provider, MD   Omega-3 Fatty Acids (FISH OIL) 1000 MG CPDR Take 1 capsule by mouth daily    Historical

## 2023-05-19 NOTE — H&P
daily    Historical Provider, MD   calcium citrate (CALCITRATE) 950 MG tablet Take 1 tablet by mouth daily    Historical Provider, MD       Allergies:  Latex, Acetaminophen, Atorvastatin, Crestor [rosuvastatin], Dorzolamide, Fosamax [alendronate], Hydrocodone-acetaminophen, Livalo [pitavastatin], Magnesium citrate, Mirapex [pramipexole dihydrochloride], Sanctura [trospium], Simvastatin, Sulfa antibiotics, Tramadol, and Xalatan [latanoprost]    Social History:    Social History     Socioeconomic History    Marital status:      Spouse name: Yadi Peng    Number of children: 2    Years of education: Not on file    Highest education level: Not on file   Occupational History    Occupation: Homemaker   Tobacco Use    Smoking status: Never    Smokeless tobacco: Never   Vaping Use    Vaping Use: Never used   Substance and Sexual Activity    Alcohol use: No     Alcohol/week: 0.0 standard drinks    Drug use: No    Sexual activity: Not Currently   Other Topics Concern    Not on file   Social History Narrative    Not on file     Social Determinants of Health     Financial Resource Strain: Low Risk     Difficulty of Paying Living Expenses: Not hard at all   Food Insecurity: No Food Insecurity    Worried About [address] in the Last Year: Never true    [address] in the Last Year: Never true   Transportation Needs: Unknown    Lack of Transportation (Medical): Not on file    Lack of Transportation (Non-Medical):  No   Physical Activity: Not on file   Stress: Not on file   Social Connections: Not on file   Intimate Partner Violence: Not on file   Housing Stability: Unknown    Unable to Pay for Housing in the Last Year: Not on file    Number of Places Lived in the Last Year: Not on file    Unstable Housing in the Last Year: No       Family History:    Family History   Problem Relation Age of Onset    Dementia Mother     High Blood Pressure Mother     Stroke Mother     Cancer Father         prostate/bone

## 2023-05-19 NOTE — ANESTHESIA POSTPROCEDURE EVALUATION
POST- ANESTHESIA EVALUATION       Pt Name: Neal Callahan  MRN: [id number]  [place]: 1945  Date of evaluation: 5/19/2023  Time:  3:40 PM      /66   Pulse 66   Temp 97.3 °F (36.3 °C) (Skin)   Resp 18   Ht 5' 4\" (1.626 m)   Wt 136 lb (61.7 kg)   SpO2 98%   BMI 23.34 kg/m²      Consciousness Level  Awake  Cardiopulmonary Status  Stable  Pain Adequately Treated YES  Nausea / Vomiting  NO  Adequate Hydration  YES  Anesthesia Related Complications NONE      Electronically signed by Florina Alex MD on 5/19/2023 at 3:40 PM       Department of Anesthesiology  Postprocedure Note    Patient: Neal Callahan  MRN: [id number]  [place]: 1945  Date of evaluation: 5/19/2023      Procedure Summary     Date: 05/19/23 Room / Location: [address]    Anesthesia Start: 6590 Anesthesia Stop: 2255    Procedure: CYSTOSCOPY LEFT URETEROSCOPY WITH LEFT STENT PLACEMENT LASER ON STANDBY (Left) Diagnosis:       Left ureteral calculus      (Left ureteral calculus [N20.1])    Surgeons: Sharyn Amin MD Responsible Provider: Florina Alex MD    Anesthesia Type: general ASA Status: 3          Anesthesia Type: No value filed.     Tony Phase I: Tony Score: 10    Tony Phase II: Tony Score: 9      Anesthesia Post Evaluation

## 2023-06-06 DIAGNOSIS — N20.0 BILATERAL RENAL STONES: ICD-10-CM

## 2023-06-08 DIAGNOSIS — N20.1 LEFT URETERAL STONE: ICD-10-CM

## 2023-06-12 PROBLEM — R82.991 HYPOCITRATURIA: Status: ACTIVE | Noted: 2023-06-12

## 2023-06-19 ENCOUNTER — OFFICE VISIT (OUTPATIENT)
Age: 78
End: 2023-06-19
Payer: COMMERCIAL

## 2023-06-19 VITALS — BODY MASS INDEX: 23.9 KG/M2 | HEIGHT: 64 IN | WEIGHT: 140 LBS

## 2023-06-19 DIAGNOSIS — N32.81 OAB (OVERACTIVE BLADDER): ICD-10-CM

## 2023-06-19 DIAGNOSIS — N39.41 URGE INCONTINENCE: ICD-10-CM

## 2023-06-19 DIAGNOSIS — R82.991 HYPOCITRATURIA: Primary | ICD-10-CM

## 2023-06-19 DIAGNOSIS — N20.0 BILATERAL RENAL STONES: ICD-10-CM

## 2023-06-19 LAB
BILIRUBIN, POC: NORMAL
BLOOD URINE, POC: NORMAL
CLARITY, POC: NORMAL
COLOR, POC: YELLOW
GLUCOSE URINE, POC: NORMAL
KETONES, POC: NORMAL
LEUKOCYTE EST, POC: NORMAL
NITRITE, POC: NORMAL
PH, POC: NORMAL
PROTEIN, POC: NORMAL
SPECIFIC GRAVITY, POC: NORMAL
UROBILINOGEN, POC: NORMAL

## 2023-06-19 PROCEDURE — 81003 URINALYSIS AUTO W/O SCOPE: CPT | Performed by: SPECIALIST

## 2023-06-19 PROCEDURE — 1123F ACP DISCUSS/DSCN MKR DOCD: CPT | Performed by: SPECIALIST

## 2023-06-19 PROCEDURE — 99214 OFFICE O/P EST MOD 30 MIN: CPT | Performed by: SPECIALIST

## 2023-06-19 NOTE — PROGRESS NOTES
[address] [facility] Urology Office Progress Note    Patient:  Uli Roa  YOB: 1945  Date: 6/19/2023    HISTORY OF PRESENT ILLNESS:   The patient is a 70 y.o. female  Patient underwent Left ureteroscopy and Holmium laser lithotripsy on 5/5/23 and 5/19/23 (the latter for a persisting obstructing ureteral calculus and renal calculi). Her 6/6/23 KUB shows some small left residual non-obstructing kidney stones. Patient's 24 hour urine shows a urine volume of 0.96 L a day; it needs to be 2.5 L a day to decrease risk of future stones. Patient needs to drink at least 10 eight ounce glasses of water a day to decrease risk of recurrent kidney stones. Patient's 24 hour urine shows a low urine citrate levels. Patient needs to drink lemonade daily to increase urinary citrate levels and to decrease risk of kidney stones (1/4 cup of real lemon juice in 16-20 ounces of water, sweetened to taste). Lower urinary tract symptoms: urgency and nocturia, 2 times per night   Last AUA Symptom Score (QOL): 3 (3)  Today's AUA Symptom Score (QOL): 3 (1)    Summary of old records:   Bilateral kidney stones and 4 mm left ureteral calculus on 10/22/15 CT; KUB 11/20/18 shows bilateral small KS; Left ESWL 12/11/15;  Left 4 mm proximal stone, b/l KS on 5/2/23 CT; 5/5/23 and 5/19/23 L HLL  11/6/15 [facility]: vol=716 mL, Ra=629, Ip=8683, citrate=498, oxalate=6, uric acid=415  4/4/19 [facility]: volume=690 mL, Yo=951, oxalate=13, citrate=478  6/6/23 [facility]: volume=960 mL, Wr=415, oxalate=15, citrate=318   FmHx of kidney stones in father  Bilateral renal cysts on 10/22/15 CT  Microhematuria  OAB, frequency and urge incontinence (4 ppd): trospium 20 mg qd to bid (HA and stomach upset), can't tolerate Myrbetriq 50 mg qd, Toviaz 4 mg qd didn't work, stopped solifenacin 5 mg qAM (constipation); discussed Botox and Interstim; Gemtesa (vibegron) 75 mg po qd 3/28/22 didn't work  5/20/19

## 2023-07-21 ENCOUNTER — HOSPITAL ENCOUNTER (OUTPATIENT)
Dept: CARDIAC CATH/INVASIVE PROCEDURES | Age: 78
Discharge: HOME | End: 2023-07-21
Payer: COMMERCIAL

## 2023-07-21 PROCEDURE — 93660 TILT TABLE EVALUATION: CPT

## 2023-07-21 NOTE — PROCEDURES
[facility]  Tilt Table Test Report                   Today's Date:  7/21/2023  Patient Name:  Kirk Schilder  Date of admission: 7/21/2023  8:17 AM  Patient's age:  70 y.o., 1945  Admission Dx:  Syncope and collapse [R55]    Requesting Physician: Niharika Zee MD    Procedures performed:  Tilt table testing      Indication of the procedure:  Kirk Schilder is a 70 y.o. female presented with near syncope. Details of procedure:  Procedure's risks, benefits and alternatives of procedure were explained to patient. All questions were answered. Patient understood and informed consent was obtained. The patient was brought to the electrophysiology lab in a fasting nonsedated state. Peripheral IV access was obtained and he was put on the tilt table test.      Initial vital signs at baseline:  BP: 153/76 mm Hg  HR: 73/min     Then the tilt table was raised to 70 degree. Immediately upon raising the table the vitals were:     BP: 129/88 mm Hg  HR: 92/min     After 20 minutes, patient received 0.4 mg NTG sublingual. The vital signs were:    BP: 140/93 mm Hg  HR: 81/min    10 minutes after NTG, patient's vital signs were:    BP: 121/64 mm Hg  HR: 75/min      At this time patient felt dizzy and had blurry vision. Shortly after, the patient had loss of consciousness. The Tilt table test was immediately brought back to [place] position and patient received an IV fluid bolus. At the end of the procedure, patient's vital signs were:  BP: 120/62 mm Hg  HR: 65/min      The patient tolerated the procedure well and there were no complications. Conclusion:  Positive for neurocardiogenic syncope      Recommendation:   Recommended to avoid dehydration, paying close attention to any prodromal symptoms and how to avert syncope by lying down and elevating legs.    Patient also to have compression stocking and encouraged to use them when working long hours and need to stand for a

## 2023-07-23 PROBLEM — R55 NEUROCARDIOGENIC SYNCOPE: Status: ACTIVE | Noted: 2023-07-23

## 2023-07-23 PROBLEM — N20.0 CALCIUM OXALATE STONES: Status: RESOLVED | Noted: 2019-02-15 | Resolved: 2023-07-23

## 2023-07-23 PROBLEM — G25.81 RESTLESS LEGS: Status: RESOLVED | Noted: 2018-03-14 | Resolved: 2023-07-23

## 2023-07-23 PROBLEM — E16.2 HYPOGLYCEMIA: Status: RESOLVED | Noted: 2018-12-20 | Resolved: 2023-07-23

## 2023-08-10 PROBLEM — R55 SYNCOPE AND COLLAPSE: Status: ACTIVE | Noted: 2023-08-10

## 2024-02-19 PROBLEM — F03.A0 MILD DEMENTIA WITHOUT BEHAVIORAL DISTURBANCE, PSYCHOTIC DISTURBANCE, MOOD DISTURBANCE, OR ANXIETY (HCC): Status: ACTIVE | Noted: 2024-02-19

## 2024-08-29 ENCOUNTER — HOSPITAL ENCOUNTER (INPATIENT)
Age: 79
LOS: 1 days | Discharge: HOME | DRG: 661 | End: 2024-08-30
Attending: INTERNAL MEDICINE | Admitting: INTERNAL MEDICINE
Payer: COMMERCIAL

## 2024-08-29 ENCOUNTER — HOSPITAL ENCOUNTER (EMERGENCY)
Facility: CLINIC | Age: 79
Discharge: ANOTHER HOSPITAL | End: 2024-08-29
Attending: EMERGENCY MEDICINE
Payer: COMMERCIAL

## 2024-08-29 ENCOUNTER — TELEPHONE (OUTPATIENT)
Age: 79
End: 2024-08-29

## 2024-08-29 ENCOUNTER — APPOINTMENT (OUTPATIENT)
Dept: CT IMAGING | Facility: CLINIC | Age: 79
End: 2024-08-29
Payer: COMMERCIAL

## 2024-08-29 VITALS
WEIGHT: 141 LBS | HEART RATE: 80 BPM | SYSTOLIC BLOOD PRESSURE: 130 MMHG | BODY MASS INDEX: 24.07 KG/M2 | DIASTOLIC BLOOD PRESSURE: 67 MMHG | OXYGEN SATURATION: 98 % | HEIGHT: 64 IN | TEMPERATURE: 98.2 F | RESPIRATION RATE: 18 BRPM

## 2024-08-29 DIAGNOSIS — N20.0 BILATERAL RENAL STONES: ICD-10-CM

## 2024-08-29 DIAGNOSIS — R10.9 LEFT FLANK PAIN: Primary | ICD-10-CM

## 2024-08-29 DIAGNOSIS — K86.89 PANCREATIC MASS: ICD-10-CM

## 2024-08-29 DIAGNOSIS — N20.0 KIDNEY STONE: Primary | ICD-10-CM

## 2024-08-29 PROBLEM — I10 PRIMARY HYPERTENSION: Status: ACTIVE | Noted: 2024-08-29

## 2024-08-29 PROBLEM — N13.8 URINARY TRACT OBSTRUCTION BY KIDNEY STONE: Status: ACTIVE | Noted: 2024-08-29

## 2024-08-29 LAB
ALBUMIN SERPL-MCNC: 4.6 G/DL (ref 3.5–5.2)
ALBUMIN/GLOB SERPL: 1.4 {RATIO} (ref 1–2.5)
ALP SERPL-CCNC: 119 U/L (ref 35–104)
ALT SERPL-CCNC: 29 U/L (ref 5–33)
AMYLASE SERPL-CCNC: 90 U/L (ref 28–100)
ANION GAP SERPL CALCULATED.3IONS-SCNC: 7 MMOL/L (ref 9–17)
AST SERPL-CCNC: 27 U/L
BACTERIA URNS QL MICRO: ABNORMAL
BASOPHILS # BLD: 0.1 K/UL (ref 0–0.2)
BASOPHILS NFR BLD: 1 % (ref 0–2)
BILIRUB DIRECT SERPL-MCNC: 0.2 MG/DL
BILIRUB INDIRECT SERPL-MCNC: 0.8 MG/DL (ref 0–1)
BILIRUB SERPL-MCNC: 1 MG/DL (ref 0.3–1.2)
BILIRUB UR QL STRIP: NEGATIVE
BUN SERPL-MCNC: 33 MG/DL (ref 8–23)
CALCIUM SERPL-MCNC: 9.8 MG/DL (ref 8.6–10.4)
CHARACTER UR: ABNORMAL
CHLORIDE SERPL-SCNC: 103 MMOL/L (ref 98–107)
CLARITY UR: CLEAR
CO2 SERPL-SCNC: 26 MMOL/L (ref 20–31)
COLOR UR: YELLOW
CREAT SERPL-MCNC: 0.9 MG/DL (ref 0.5–0.9)
EOSINOPHIL # BLD: 0.1 K/UL (ref 0–0.4)
EOSINOPHILS RELATIVE PERCENT: 1 % (ref 1–4)
EPI CELLS #/AREA URNS HPF: ABNORMAL /HPF (ref 0–5)
ERYTHROCYTE [DISTWIDTH] IN BLOOD BY AUTOMATED COUNT: 13.6 % (ref 12.5–15.4)
GFR, ESTIMATED: 66 ML/MIN/1.73M2
GLUCOSE SERPL-MCNC: 105 MG/DL (ref 70–99)
GLUCOSE UR STRIP-MCNC: NEGATIVE MG/DL
HCT VFR BLD AUTO: 42.1 % (ref 36–46)
HGB BLD-MCNC: 14.1 G/DL (ref 12–16)
HGB UR QL STRIP.AUTO: ABNORMAL
KETONES UR STRIP-MCNC: NEGATIVE MG/DL
LEUKOCYTE ESTERASE UR QL STRIP: ABNORMAL
LIPASE SERPL-CCNC: 135 U/L (ref 13–60)
LYMPHOCYTES NFR BLD: 1.5 K/UL (ref 1–4.8)
LYMPHOCYTES RELATIVE PERCENT: 14 % (ref 24–44)
MCH RBC QN AUTO: 32.2 PG (ref 26–34)
MCHC RBC AUTO-ENTMCNC: 33.4 G/DL (ref 31–37)
MCV RBC AUTO: 96.3 FL (ref 80–100)
MONOCYTES NFR BLD: 0.9 K/UL (ref 0.1–1.2)
MONOCYTES NFR BLD: 9 % (ref 2–11)
NEUTROPHILS NFR BLD: 75 % (ref 36–66)
NEUTS SEG NFR BLD: 8.3 K/UL (ref 1.8–7.7)
NITRITE UR QL STRIP: NEGATIVE
PH UR STRIP: 6 [PH] (ref 5–8)
PLATELET # BLD AUTO: 229 K/UL (ref 140–450)
PMV BLD AUTO: 7.7 FL (ref 6–12)
POTASSIUM SERPL-SCNC: 4.3 MMOL/L (ref 3.7–5.3)
PROT SERPL-MCNC: 7.9 G/DL (ref 6.4–8.3)
PROT UR STRIP-MCNC: NEGATIVE MG/DL
RBC # BLD AUTO: 4.38 M/UL (ref 4–5.2)
RBC #/AREA URNS HPF: ABNORMAL /HPF (ref 0–2)
SODIUM SERPL-SCNC: 136 MMOL/L (ref 135–144)
SP GR UR STRIP: 1.02 (ref 1–1.03)
UROBILINOGEN UR STRIP-ACNC: NORMAL EU/DL (ref 0–1)
WBC #/AREA URNS HPF: ABNORMAL /HPF (ref 0–5)
WBC OTHER # BLD: 10.9 K/UL (ref 3.5–11)

## 2024-08-29 PROCEDURE — 82150 ASSAY OF AMYLASE: CPT

## 2024-08-29 PROCEDURE — 6360000002 HC RX W HCPCS: Performed by: EMERGENCY MEDICINE

## 2024-08-29 PROCEDURE — 2580000003 HC RX 258

## 2024-08-29 PROCEDURE — 81001 URINALYSIS AUTO W/SCOPE: CPT

## 2024-08-29 PROCEDURE — 80048 BASIC METABOLIC PNL TOTAL CA: CPT

## 2024-08-29 PROCEDURE — 99285 EMERGENCY DEPT VISIT HI MDM: CPT

## 2024-08-29 PROCEDURE — 96374 THER/PROPH/DIAG INJ IV PUSH: CPT

## 2024-08-29 PROCEDURE — 85025 COMPLETE CBC W/AUTO DIFF WBC: CPT

## 2024-08-29 PROCEDURE — 6360000002 HC RX W HCPCS

## 2024-08-29 PROCEDURE — 1210000000 HC MED SURG R&B

## 2024-08-29 PROCEDURE — 74176 CT ABD & PELVIS W/O CONTRAST: CPT

## 2024-08-29 PROCEDURE — 36415 COLL VENOUS BLD VENIPUNCTURE: CPT

## 2024-08-29 PROCEDURE — 83690 ASSAY OF LIPASE: CPT

## 2024-08-29 PROCEDURE — 2580000003 HC RX 258: Performed by: INTERNAL MEDICINE

## 2024-08-29 PROCEDURE — 2580000003 HC RX 258: Performed by: EMERGENCY MEDICINE

## 2024-08-29 PROCEDURE — 80076 HEPATIC FUNCTION PANEL: CPT

## 2024-08-29 PROCEDURE — 99222 1ST HOSP IP/OBS MODERATE 55: CPT

## 2024-08-29 RX ORDER — ATENOLOL 25 MG/1
25 TABLET ORAL DAILY
Status: DISCONTINUED | OUTPATIENT
Start: 2024-08-30 | End: 2024-08-30 | Stop reason: HOSPADM

## 2024-08-29 RX ORDER — TAMSULOSIN HYDROCHLORIDE 0.4 MG/1
0.4 CAPSULE ORAL DAILY
Qty: 30 CAPSULE | Refills: 0 | Status: SHIPPED | OUTPATIENT
Start: 2024-08-29

## 2024-08-29 RX ORDER — 0.9 % SODIUM CHLORIDE 0.9 %
500 INTRAVENOUS SOLUTION INTRAVENOUS ONCE
Status: COMPLETED | OUTPATIENT
Start: 2024-08-29 | End: 2024-08-29

## 2024-08-29 RX ORDER — PRAVASTATIN SODIUM 20 MG
80 TABLET ORAL DAILY
Status: DISCONTINUED | OUTPATIENT
Start: 2024-08-30 | End: 2024-08-30 | Stop reason: HOSPADM

## 2024-08-29 RX ORDER — TAMSULOSIN HYDROCHLORIDE 0.4 MG/1
0.4 CAPSULE ORAL DAILY
Status: DISCONTINUED | OUTPATIENT
Start: 2024-08-30 | End: 2024-08-30 | Stop reason: HOSPADM

## 2024-08-29 RX ORDER — SODIUM CHLORIDE 9 MG/ML
INJECTION, SOLUTION INTRAVENOUS CONTINUOUS
Status: DISCONTINUED | OUTPATIENT
Start: 2024-08-29 | End: 2024-08-30

## 2024-08-29 RX ORDER — SODIUM CHLORIDE 9 MG/ML
INJECTION, SOLUTION INTRAVENOUS PRN
Status: DISCONTINUED | OUTPATIENT
Start: 2024-08-29 | End: 2024-08-30 | Stop reason: HOSPADM

## 2024-08-29 RX ORDER — POTASSIUM CHLORIDE 7.45 MG/ML
10 INJECTION INTRAVENOUS PRN
Status: DISCONTINUED | OUTPATIENT
Start: 2024-08-29 | End: 2024-08-30 | Stop reason: HOSPADM

## 2024-08-29 RX ORDER — ONDANSETRON 4 MG/1
4 TABLET, ORALLY DISINTEGRATING ORAL EVERY 8 HOURS PRN
Status: DISCONTINUED | OUTPATIENT
Start: 2024-08-29 | End: 2024-08-30 | Stop reason: HOSPADM

## 2024-08-29 RX ORDER — ONDANSETRON 2 MG/ML
4 INJECTION INTRAMUSCULAR; INTRAVENOUS EVERY 6 HOURS PRN
Status: DISCONTINUED | OUTPATIENT
Start: 2024-08-29 | End: 2024-08-30 | Stop reason: HOSPADM

## 2024-08-29 RX ORDER — FENTANYL CITRATE 50 UG/ML
25 INJECTION, SOLUTION INTRAMUSCULAR; INTRAVENOUS
Status: DISCONTINUED | OUTPATIENT
Start: 2024-08-29 | End: 2024-08-30 | Stop reason: HOSPADM

## 2024-08-29 RX ORDER — POTASSIUM CHLORIDE 1500 MG/1
40 TABLET, EXTENDED RELEASE ORAL PRN
Status: DISCONTINUED | OUTPATIENT
Start: 2024-08-29 | End: 2024-08-30 | Stop reason: HOSPADM

## 2024-08-29 RX ORDER — MEMANTINE HYDROCHLORIDE 5 MG/1
5 TABLET ORAL 2 TIMES DAILY
Status: DISCONTINUED | OUTPATIENT
Start: 2024-08-29 | End: 2024-08-30 | Stop reason: HOSPADM

## 2024-08-29 RX ORDER — MAGNESIUM SULFATE 1 G/100ML
1000 INJECTION INTRAVENOUS PRN
Status: DISCONTINUED | OUTPATIENT
Start: 2024-08-29 | End: 2024-08-30 | Stop reason: HOSPADM

## 2024-08-29 RX ORDER — KETOROLAC TROMETHAMINE 15 MG/ML
15 INJECTION, SOLUTION INTRAMUSCULAR; INTRAVENOUS ONCE
Status: COMPLETED | OUTPATIENT
Start: 2024-08-29 | End: 2024-08-29

## 2024-08-29 RX ORDER — ACETAMINOPHEN 325 MG/1
650 TABLET ORAL EVERY 6 HOURS PRN
Status: DISCONTINUED | OUTPATIENT
Start: 2024-08-29 | End: 2024-08-30 | Stop reason: HOSPADM

## 2024-08-29 RX ORDER — ACETAMINOPHEN 650 MG/1
650 SUPPOSITORY RECTAL EVERY 6 HOURS PRN
Status: DISCONTINUED | OUTPATIENT
Start: 2024-08-29 | End: 2024-08-30 | Stop reason: HOSPADM

## 2024-08-29 RX ORDER — SODIUM CHLORIDE 0.9 % (FLUSH) 0.9 %
10 SYRINGE (ML) INJECTION PRN
Status: DISCONTINUED | OUTPATIENT
Start: 2024-08-29 | End: 2024-08-30 | Stop reason: HOSPADM

## 2024-08-29 RX ORDER — POLYETHYLENE GLYCOL 3350 17 G/17G
17 POWDER, FOR SOLUTION ORAL DAILY PRN
Status: DISCONTINUED | OUTPATIENT
Start: 2024-08-29 | End: 2024-08-30 | Stop reason: HOSPADM

## 2024-08-29 RX ORDER — SODIUM CHLORIDE 0.9 % (FLUSH) 0.9 %
5-40 SYRINGE (ML) INJECTION EVERY 12 HOURS SCHEDULED
Status: DISCONTINUED | OUTPATIENT
Start: 2024-08-29 | End: 2024-08-30 | Stop reason: HOSPADM

## 2024-08-29 RX ORDER — ENOXAPARIN SODIUM 100 MG/ML
40 INJECTION SUBCUTANEOUS DAILY
Status: DISCONTINUED | OUTPATIENT
Start: 2024-08-30 | End: 2024-08-30 | Stop reason: HOSPADM

## 2024-08-29 RX ADMIN — FENTANYL CITRATE 25 MCG: 50 INJECTION, SOLUTION INTRAMUSCULAR; INTRAVENOUS at 21:57

## 2024-08-29 RX ADMIN — SODIUM CHLORIDE: 9 INJECTION, SOLUTION INTRAVENOUS at 21:54

## 2024-08-29 RX ADMIN — SODIUM CHLORIDE 500 ML: 9 INJECTION, SOLUTION INTRAVENOUS at 15:18

## 2024-08-29 RX ADMIN — KETOROLAC TROMETHAMINE 15 MG: 15 INJECTION, SOLUTION INTRAMUSCULAR; INTRAVENOUS at 15:18

## 2024-08-29 RX ADMIN — CEFTRIAXONE SODIUM 1000 MG: 1 INJECTION, POWDER, FOR SOLUTION INTRAMUSCULAR; INTRAVENOUS at 21:56

## 2024-08-29 ASSESSMENT — PAIN SCALES - GENERAL
PAINLEVEL_OUTOF10: 0
PAINLEVEL_OUTOF10: 5
PAINLEVEL_OUTOF10: 2
PAINLEVEL_OUTOF10: 6
PAINLEVEL_OUTOF10: 2

## 2024-08-29 ASSESSMENT — PAIN DESCRIPTION - LOCATION
LOCATION: FLANK
LOCATION: ABDOMEN

## 2024-08-29 ASSESSMENT — PAIN - FUNCTIONAL ASSESSMENT
PAIN_FUNCTIONAL_ASSESSMENT: ACTIVITIES ARE NOT PREVENTED
PAIN_FUNCTIONAL_ASSESSMENT: 0-10

## 2024-08-29 ASSESSMENT — PAIN DESCRIPTION - PAIN TYPE: TYPE: ACUTE PAIN

## 2024-08-29 ASSESSMENT — PAIN DESCRIPTION - ONSET: ONSET: ON-GOING

## 2024-08-29 ASSESSMENT — ENCOUNTER SYMPTOMS
VOMITING: 0
CONSTIPATION: 0
COUGH: 0
NAUSEA: 0
BLOOD IN STOOL: 0
SHORTNESS OF BREATH: 0
WHEEZING: 0
ABDOMINAL PAIN: 1
DIARRHEA: 0

## 2024-08-29 ASSESSMENT — PAIN DESCRIPTION - FREQUENCY: FREQUENCY: CONTINUOUS

## 2024-08-29 ASSESSMENT — PAIN SCALES - WONG BAKER: WONGBAKER_NUMERICALRESPONSE: NO HURT

## 2024-08-29 ASSESSMENT — PAIN DESCRIPTION - DESCRIPTORS
DESCRIPTORS: ACHING
DESCRIPTORS: CRAMPING;THROBBING

## 2024-08-29 ASSESSMENT — PAIN DESCRIPTION - ORIENTATION
ORIENTATION: LEFT
ORIENTATION: LEFT;LOWER

## 2024-08-29 NOTE — ED PROVIDER NOTES
[facility] ED  [address] [address]  Phone: [phone]  EMERGENCY DEPARTMENT ENCOUNTER      Pt Name: Rosa Ugarte  MRN: [id number]  Birthdate 1945  Date of evaluation: 8/29/2024    CHIEF COMPLAINT       Chief Complaint   Patient presents with    Abdominal Pain       HISTORY OF PRESENT ILLNESS    Rosa Ugarte is a 79 y.o. female who presents to the emergency room complaining of left-sided abdominal pain that started around 10:00 this morning.  Went to bed and woke up feeling fine.  History of kidney stones has had stents before.  She was sent in by her urologist.  She is complaining of pain with radiation to her back.  No fevers or chills.  No nausea or vomiting.  Denies any diarrhea or constipation.  Pain 5 out of 10 nothing seems to make it better or worse denies any injury.    REVIEW OF SYSTEMS       Constitutional: No fevers or chills   HENT: No sore throat, rhinorrhea, or earache   Eyes: No blurry vision or double vision no drainage   Cardiovascular: No chest pain or tachycardia   Respiratory: No wheezing or shortness of breath no cough   Gastrointestinal: No nausea, vomiting, diarrhea, constipation, positive left-sided abdominal pain   : No hematuria or dysuria   Musculoskeletal: No extremity swelling or pain positive left flank pain  Skin: No rash   Neurological: No focal neurologic complaints, paresthesias, weakness, or headache     PAST MEDICAL HISTORY    has a past medical history of Arthritis, Autologous donor of stem cells, Caffeine use, Calcium nephrolithiasis, Cancer (HCC), Cataract, right eye, Hemorrhoids, History of breast cancer, History of breast cancer, History of kidney stones, Hypercholesteremia, Hyperlipidemia, Hypertension, Kidney stones, Migraines, Osteoporosis, Pneumonia, and Vitamin D deficiency.    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  There is a kidney stone left upper ureteral stone 7.1 mm and a distal right ureteral stone of 2.9 mm.  Updated the patient on the CAT scan results and concern for the pancreatic mass.  Message sent to  oncology awaiting for response through [facility]    450 PM discussed with Dr. Francois urology who recommends ureteral stents being placed tomorrow at [place] if patient agreeable.  N.p.o. after midnight.    4:55 PM discussed with Dr. Cline oncology who will evaluate the patient in [place] tomorrow morning.  Patient agreeable to transfer.  ADT 20 sent to [facility] for admission.    5:20 PM Discussed with Dr. Rajani Bethea who agrees to admission. Awaiting bed assignment and patient will likely go by private auto.    CRITICAL CARE:    None    PROCEDURES:    None      OARRS Report if indicated           FINAL IMPRESSION      1. Kidney stone    2. Pancreatic mass          DISPOSITION/PLAN   DISPOSITION Decision To Transfer 08/29/2024 04:54:36 PM  Condition at Disposition: Stable        CONDITION ON DISPOSITION: See chart       PATIENT REFERRED TO:  No follow-up provider specified.    DISCHARGE MEDICATIONS:  New Prescriptions    No medications on file       (Please note that portions of this note were completed with a voice recognition program.  Efforts were made to edit the dictations but occasionally words are mis-transcribed.  Additionally, portions of this note may also include information that was incorporated after care transfer to another provider that were not available at the time of my evaluation.  Some of this information could likely include laboratory values, vital sign updates, medications etc.)    Mauri Macedo DO   Attending Emergency Physician      Mauri Macedo DO  08/29/24 1984

## 2024-08-29 NOTE — ED NOTES
Pt presents to ED c/o left lower abdominal pain that started around 1100 this morning. Pt states she spoke with her urologist who told her to go to the ED to be evaluated. Pt reports hx kidney stones and states symptoms feel similar. Pt denies n/v or issues with urination. Pt arrives A/Ox4, PWD, PMS intact. Pt resting on stretcher with call light in reach.

## 2024-08-29 NOTE — ED NOTES
[facility] CALLED FOR UPDATE ON BED STATUS [facility] STATES NO BED ASSIGNED AT THIS TIME. PT  will drive via personal vehicle.

## 2024-08-29 NOTE — TELEPHONE ENCOUNTER
Get STAT KUB and see Rx for Tamsulosin 0.4 mg po qd to facilitate expulsion of ureteral calculi in the future.  Go to ER if pain not controlled, preferably [facility].

## 2024-08-29 NOTE — ED NOTES
Pt departed en route to [place].  Refusal of transport form filled out, pt going per private vehicle to [place]

## 2024-08-29 NOTE — TELEPHONE ENCOUNTER
PT CALLED TO SAY THAT SHE THINKS SHE HAS A KIDNEY STONE- PAIN IS A 4-5 ON THE LEFT SIDE- NO NAUSEA, VOMITING OR BLOOD IN URINE-PLEASE ADVISE-KSE

## 2024-08-30 ENCOUNTER — ANESTHESIA (OUTPATIENT)
Dept: OPERATING ROOM | Age: 79
End: 2024-08-30
Payer: COMMERCIAL

## 2024-08-30 ENCOUNTER — ANESTHESIA EVENT (OUTPATIENT)
Dept: OPERATING ROOM | Age: 79
End: 2024-08-30
Payer: COMMERCIAL

## 2024-08-30 ENCOUNTER — APPOINTMENT (OUTPATIENT)
Dept: GENERAL RADIOLOGY | Age: 79
DRG: 661 | End: 2024-08-30
Attending: INTERNAL MEDICINE
Payer: COMMERCIAL

## 2024-08-30 VITALS
DIASTOLIC BLOOD PRESSURE: 137 MMHG | OXYGEN SATURATION: 95 % | SYSTOLIC BLOOD PRESSURE: 149 MMHG | TEMPERATURE: 97.3 F | BODY MASS INDEX: 24.41 KG/M2 | WEIGHT: 143 LBS | RESPIRATION RATE: 16 BRPM | HEIGHT: 64 IN | HEART RATE: 69 BPM

## 2024-08-30 LAB
ANION GAP SERPL CALCULATED.3IONS-SCNC: 9 MMOL/L (ref 9–17)
BUN SERPL-MCNC: 30 MG/DL (ref 8–23)
CALCIUM SERPL-MCNC: 9.1 MG/DL (ref 8.6–10.4)
CANCER AG19-9 SERPL IA-ACNC: 981 U/ML (ref 0–35)
CHLORIDE SERPL-SCNC: 108 MMOL/L (ref 98–107)
CO2 SERPL-SCNC: 24 MMOL/L (ref 20–31)
CREAT SERPL-MCNC: 1 MG/DL (ref 0.5–0.9)
GFR, ESTIMATED: 58 ML/MIN/1.73M2
GLUCOSE SERPL-MCNC: 109 MG/DL (ref 70–99)
INR PPP: 0.9
POTASSIUM SERPL-SCNC: 4.1 MMOL/L (ref 3.7–5.3)
PROTHROMBIN TIME: 9.5 SEC (ref 9.4–12.6)
SODIUM SERPL-SCNC: 141 MMOL/L (ref 135–144)

## 2024-08-30 PROCEDURE — 3700000000 HC ANESTHESIA ATTENDED CARE: Performed by: SPECIALIST

## 2024-08-30 PROCEDURE — 7100000000 HC PACU RECOVERY - FIRST 15 MIN: Performed by: SPECIALIST

## 2024-08-30 PROCEDURE — 3600000014 HC SURGERY LEVEL 4 ADDTL 15MIN: Performed by: SPECIALIST

## 2024-08-30 PROCEDURE — 99222 1ST HOSP IP/OBS MODERATE 55: CPT | Performed by: INTERNAL MEDICINE

## 2024-08-30 PROCEDURE — 52356 CYSTO/URETERO W/LITHOTRIPSY: CPT | Performed by: SPECIALIST

## 2024-08-30 PROCEDURE — 2709999900 HC NON-CHARGEABLE SUPPLY: Performed by: SPECIALIST

## 2024-08-30 PROCEDURE — 85610 PROTHROMBIN TIME: CPT

## 2024-08-30 PROCEDURE — 86301 IMMUNOASSAY TUMOR CA 19-9: CPT

## 2024-08-30 PROCEDURE — 99238 HOSP IP/OBS DSCHRG MGMT 30/<: CPT | Performed by: INTERNAL MEDICINE

## 2024-08-30 PROCEDURE — 52351 CYSTOURETERO & OR PYELOSCOPE: CPT | Performed by: SPECIALIST

## 2024-08-30 PROCEDURE — 6360000002 HC RX W HCPCS: Performed by: NURSE ANESTHETIST, CERTIFIED REGISTERED

## 2024-08-30 PROCEDURE — 99223 1ST HOSP IP/OBS HIGH 75: CPT | Performed by: INTERNAL MEDICINE

## 2024-08-30 PROCEDURE — 2580000003 HC RX 258: Performed by: INTERNAL MEDICINE

## 2024-08-30 PROCEDURE — 36415 COLL VENOUS BLD VENIPUNCTURE: CPT

## 2024-08-30 PROCEDURE — 80048 BASIC METABOLIC PNL TOTAL CA: CPT

## 2024-08-30 PROCEDURE — 2580000003 HC RX 258: Performed by: ANESTHESIOLOGY

## 2024-08-30 PROCEDURE — 7100000001 HC PACU RECOVERY - ADDTL 15 MIN: Performed by: SPECIALIST

## 2024-08-30 PROCEDURE — 6370000000 HC RX 637 (ALT 250 FOR IP): Performed by: SPECIALIST

## 2024-08-30 PROCEDURE — 99222 1ST HOSP IP/OBS MODERATE 55: CPT | Performed by: SPECIALIST

## 2024-08-30 PROCEDURE — C1769 GUIDE WIRE: HCPCS | Performed by: SPECIALIST

## 2024-08-30 PROCEDURE — 3600000004 HC SURGERY LEVEL 4 BASE: Performed by: SPECIALIST

## 2024-08-30 PROCEDURE — 0T768DZ DILATION OF RIGHT URETER WITH INTRALUMINAL DEVICE, VIA NATURAL OR ARTIFICIAL OPENING ENDOSCOPIC: ICD-10-PCS | Performed by: SPECIALIST

## 2024-08-30 PROCEDURE — 2500000003 HC RX 250 WO HCPCS: Performed by: NURSE ANESTHETIST, CERTIFIED REGISTERED

## 2024-08-30 PROCEDURE — C2617 STENT, NON-COR, TEM W/O DEL: HCPCS | Performed by: SPECIALIST

## 2024-08-30 PROCEDURE — 6370000000 HC RX 637 (ALT 250 FOR IP)

## 2024-08-30 PROCEDURE — 3700000001 HC ADD 15 MINUTES (ANESTHESIA): Performed by: SPECIALIST

## 2024-08-30 PROCEDURE — 2720000010 HC SURG SUPPLY STERILE: Performed by: SPECIALIST

## 2024-08-30 PROCEDURE — APPNB30 APP NON BILLABLE TIME 0-30 MINS: Performed by: NURSE PRACTITIONER

## 2024-08-30 DEVICE — URETERAL STENT
Type: IMPLANTABLE DEVICE | Site: URETER | Status: FUNCTIONAL
Brand: POLARIS™ ULTRA

## 2024-08-30 RX ORDER — IPRATROPIUM BROMIDE AND ALBUTEROL SULFATE 2.5; .5 MG/3ML; MG/3ML
1 SOLUTION RESPIRATORY (INHALATION)
Status: DISCONTINUED | OUTPATIENT
Start: 2024-08-30 | End: 2024-08-30 | Stop reason: HOSPADM

## 2024-08-30 RX ORDER — FENTANYL CITRATE 50 UG/ML
INJECTION, SOLUTION INTRAMUSCULAR; INTRAVENOUS PRN
Status: DISCONTINUED | OUTPATIENT
Start: 2024-08-30 | End: 2024-08-30 | Stop reason: SDUPTHER

## 2024-08-30 RX ORDER — AMPICILLIN TRIHYDRATE 250 MG
1 CAPSULE ORAL DAILY
Status: DISCONTINUED | OUTPATIENT
Start: 2024-08-30 | End: 2024-08-30

## 2024-08-30 RX ORDER — DIPHENHYDRAMINE HYDROCHLORIDE 50 MG/ML
12.5 INJECTION INTRAMUSCULAR; INTRAVENOUS
Status: DISCONTINUED | OUTPATIENT
Start: 2024-08-30 | End: 2024-08-30 | Stop reason: HOSPADM

## 2024-08-30 RX ORDER — SODIUM CHLORIDE, SODIUM LACTATE, POTASSIUM CHLORIDE, CALCIUM CHLORIDE 600; 310; 30; 20 MG/100ML; MG/100ML; MG/100ML; MG/100ML
INJECTION, SOLUTION INTRAVENOUS CONTINUOUS
Status: DISCONTINUED | OUTPATIENT
Start: 2024-08-30 | End: 2024-08-30 | Stop reason: HOSPADM

## 2024-08-30 RX ORDER — VITAMIN B COMPLEX
5000 TABLET ORAL DAILY
Status: DISCONTINUED | OUTPATIENT
Start: 2024-08-30 | End: 2024-08-30 | Stop reason: HOSPADM

## 2024-08-30 RX ORDER — SODIUM CHLORIDE 0.9 % (FLUSH) 0.9 %
5-40 SYRINGE (ML) INJECTION EVERY 12 HOURS SCHEDULED
Status: DISCONTINUED | OUTPATIENT
Start: 2024-08-30 | End: 2024-08-30 | Stop reason: HOSPADM

## 2024-08-30 RX ORDER — KETOROLAC TROMETHAMINE 30 MG/ML
INJECTION, SOLUTION INTRAMUSCULAR; INTRAVENOUS PRN
Status: DISCONTINUED | OUTPATIENT
Start: 2024-08-30 | End: 2024-08-30 | Stop reason: SDUPTHER

## 2024-08-30 RX ORDER — SODIUM CHLORIDE 0.9 % (FLUSH) 0.9 %
5-40 SYRINGE (ML) INJECTION PRN
Status: DISCONTINUED | OUTPATIENT
Start: 2024-08-30 | End: 2024-08-30 | Stop reason: HOSPADM

## 2024-08-30 RX ORDER — ONDANSETRON 2 MG/ML
INJECTION INTRAMUSCULAR; INTRAVENOUS PRN
Status: DISCONTINUED | OUTPATIENT
Start: 2024-08-30 | End: 2024-08-30 | Stop reason: SDUPTHER

## 2024-08-30 RX ORDER — SODIUM CHLORIDE 9 MG/ML
INJECTION, SOLUTION INTRAVENOUS PRN
Status: DISCONTINUED | OUTPATIENT
Start: 2024-08-30 | End: 2024-08-30 | Stop reason: HOSPADM

## 2024-08-30 RX ORDER — PROPOFOL 10 MG/ML
INJECTION, EMULSION INTRAVENOUS PRN
Status: DISCONTINUED | OUTPATIENT
Start: 2024-08-30 | End: 2024-08-30 | Stop reason: SDUPTHER

## 2024-08-30 RX ORDER — CIDER VINEGAR 300 MG
1000 TABLET ORAL DAILY
Status: DISCONTINUED | OUTPATIENT
Start: 2024-08-30 | End: 2024-08-30

## 2024-08-30 RX ORDER — LABETALOL HYDROCHLORIDE 5 MG/ML
10 INJECTION, SOLUTION INTRAVENOUS
Status: DISCONTINUED | OUTPATIENT
Start: 2024-08-30 | End: 2024-08-30 | Stop reason: HOSPADM

## 2024-08-30 RX ORDER — METOCLOPRAMIDE HYDROCHLORIDE 5 MG/ML
10 INJECTION INTRAMUSCULAR; INTRAVENOUS
Status: DISCONTINUED | OUTPATIENT
Start: 2024-08-30 | End: 2024-08-30 | Stop reason: HOSPADM

## 2024-08-30 RX ORDER — DEXAMETHASONE SODIUM PHOSPHATE 10 MG/ML
INJECTION, SOLUTION INTRAMUSCULAR; INTRAVENOUS PRN
Status: DISCONTINUED | OUTPATIENT
Start: 2024-08-30 | End: 2024-08-30 | Stop reason: SDUPTHER

## 2024-08-30 RX ORDER — CEFUROXIME AXETIL 250 MG/1
250 TABLET ORAL 2 TIMES DAILY
Qty: 10 TABLET | Refills: 0 | Status: SHIPPED | OUTPATIENT
Start: 2024-08-30 | End: 2024-09-04

## 2024-08-30 RX ORDER — GLYCOPYRROLATE 0.2 MG/ML
INJECTION INTRAMUSCULAR; INTRAVENOUS PRN
Status: DISCONTINUED | OUTPATIENT
Start: 2024-08-30 | End: 2024-08-30 | Stop reason: SDUPTHER

## 2024-08-30 RX ORDER — HYDRALAZINE HYDROCHLORIDE 20 MG/ML
10 INJECTION INTRAMUSCULAR; INTRAVENOUS
Status: DISCONTINUED | OUTPATIENT
Start: 2024-08-30 | End: 2024-08-30 | Stop reason: HOSPADM

## 2024-08-30 RX ORDER — PHENAZOPYRIDINE HYDROCHLORIDE 100 MG/1
200 TABLET, FILM COATED ORAL ONCE
Status: COMPLETED | OUTPATIENT
Start: 2024-08-30 | End: 2024-08-30

## 2024-08-30 RX ORDER — MEPERIDINE HYDROCHLORIDE 50 MG/ML
12.5 INJECTION INTRAMUSCULAR; INTRAVENOUS; SUBCUTANEOUS EVERY 5 MIN PRN
Status: DISCONTINUED | OUTPATIENT
Start: 2024-08-30 | End: 2024-08-30 | Stop reason: HOSPADM

## 2024-08-30 RX ORDER — NALOXONE HYDROCHLORIDE 0.4 MG/ML
INJECTION, SOLUTION INTRAMUSCULAR; INTRAVENOUS; SUBCUTANEOUS PRN
Status: DISCONTINUED | OUTPATIENT
Start: 2024-08-30 | End: 2024-08-30 | Stop reason: HOSPADM

## 2024-08-30 RX ORDER — OXYCODONE AND ACETAMINOPHEN 5; 325 MG/1; MG/1
2 TABLET ORAL
Status: DISCONTINUED | OUTPATIENT
Start: 2024-08-30 | End: 2024-08-30 | Stop reason: HOSPADM

## 2024-08-30 RX ORDER — IBUPROFEN 200 MG
200 CAPSULE ORAL DAILY
Status: DISCONTINUED | OUTPATIENT
Start: 2024-08-30 | End: 2024-08-30 | Stop reason: HOSPADM

## 2024-08-30 RX ORDER — SODIUM CHLORIDE 9 MG/ML
INJECTION, SOLUTION INTRAVENOUS CONTINUOUS
Status: DISCONTINUED | OUTPATIENT
Start: 2024-08-30 | End: 2024-08-30 | Stop reason: HOSPADM

## 2024-08-30 RX ORDER — MIDAZOLAM HYDROCHLORIDE 2 MG/2ML
2 INJECTION, SOLUTION INTRAMUSCULAR; INTRAVENOUS
Status: DISCONTINUED | OUTPATIENT
Start: 2024-08-30 | End: 2024-08-30 | Stop reason: HOSPADM

## 2024-08-30 RX ORDER — LIDOCAINE HYDROCHLORIDE 10 MG/ML
INJECTION, SOLUTION INFILTRATION; PERINEURAL PRN
Status: DISCONTINUED | OUTPATIENT
Start: 2024-08-30 | End: 2024-08-30 | Stop reason: SDUPTHER

## 2024-08-30 RX ORDER — GLYCOPYRROLATE 0.2 MG/ML
0.4 INJECTION INTRAMUSCULAR; INTRAVENOUS ONCE
Status: DISCONTINUED | OUTPATIENT
Start: 2024-08-30 | End: 2024-08-30 | Stop reason: HOSPADM

## 2024-08-30 RX ORDER — LIDOCAINE HYDROCHLORIDE 10 MG/ML
1 INJECTION, SOLUTION EPIDURAL; INFILTRATION; INTRACAUDAL; PERINEURAL
Status: DISCONTINUED | OUTPATIENT
Start: 2024-08-30 | End: 2024-08-30 | Stop reason: HOSPADM

## 2024-08-30 RX ORDER — MORPHINE SULFATE 2 MG/ML
2 INJECTION, SOLUTION INTRAMUSCULAR; INTRAVENOUS EVERY 5 MIN PRN
Status: DISCONTINUED | OUTPATIENT
Start: 2024-08-30 | End: 2024-08-30 | Stop reason: HOSPADM

## 2024-08-30 RX ORDER — PHENAZOPYRIDINE HYDROCHLORIDE 100 MG/1
TABLET, FILM COATED ORAL
Status: COMPLETED
Start: 2024-08-30 | End: 2024-08-30

## 2024-08-30 RX ORDER — OXYCODONE AND ACETAMINOPHEN 5; 325 MG/1; MG/1
1 TABLET ORAL
Status: DISCONTINUED | OUTPATIENT
Start: 2024-08-30 | End: 2024-08-30 | Stop reason: HOSPADM

## 2024-08-30 RX ORDER — ONDANSETRON 2 MG/ML
4 INJECTION INTRAMUSCULAR; INTRAVENOUS
Status: DISCONTINUED | OUTPATIENT
Start: 2024-08-30 | End: 2024-08-30 | Stop reason: HOSPADM

## 2024-08-30 RX ADMIN — ONDANSETRON 4 MG: 2 INJECTION INTRAMUSCULAR; INTRAVENOUS at 13:02

## 2024-08-30 RX ADMIN — PROPOFOL 50 MG: 10 INJECTION, EMULSION INTRAVENOUS at 12:53

## 2024-08-30 RX ADMIN — Medication 200 MG: at 16:03

## 2024-08-30 RX ADMIN — SODIUM CHLORIDE, PRESERVATIVE FREE 10 ML: 5 INJECTION INTRAVENOUS at 11:01

## 2024-08-30 RX ADMIN — Medication 5000 UNITS: at 16:03

## 2024-08-30 RX ADMIN — PHENAZOPYRIDINE 200 MG: 100 TABLET ORAL at 14:24

## 2024-08-30 RX ADMIN — SODIUM CHLORIDE: 9 INJECTION, SOLUTION INTRAVENOUS at 11:02

## 2024-08-30 RX ADMIN — FENTANYL CITRATE 100 MCG: 50 INJECTION, SOLUTION INTRAMUSCULAR; INTRAVENOUS at 12:52

## 2024-08-30 RX ADMIN — LIDOCAINE HYDROCHLORIDE 20 MG: 10 INJECTION, SOLUTION INFILTRATION; PERINEURAL at 12:52

## 2024-08-30 RX ADMIN — SODIUM CHLORIDE: 9 INJECTION, SOLUTION INTRAVENOUS at 16:03

## 2024-08-30 RX ADMIN — DEXAMETHASONE SODIUM PHOSPHATE 4 MG: 10 INJECTION, SOLUTION INTRAMUSCULAR; INTRAVENOUS at 13:00

## 2024-08-30 RX ADMIN — PROPOFOL 150 MG: 10 INJECTION, EMULSION INTRAVENOUS at 12:52

## 2024-08-30 RX ADMIN — GLYCOPYRROLATE 0.4 MG: 0.2 INJECTION INTRAMUSCULAR; INTRAVENOUS at 12:52

## 2024-08-30 RX ADMIN — POLYETHYLENE GLYCOL 3350 17 G: 17 POWDER, FOR SOLUTION ORAL at 18:22

## 2024-08-30 RX ADMIN — KETOROLAC TROMETHAMINE 30 MG: 30 INJECTION, SOLUTION INTRAMUSCULAR at 13:17

## 2024-08-30 RX ADMIN — PHENAZOPYRIDINE HYDROCHLORIDE 200 MG: 100 TABLET, FILM COATED ORAL at 14:24

## 2024-08-30 ASSESSMENT — PAIN SCALES - GENERAL
PAINLEVEL_OUTOF10: 0

## 2024-08-30 ASSESSMENT — PAIN DESCRIPTION - DESCRIPTORS: DESCRIPTORS: ACHING

## 2024-08-30 ASSESSMENT — PAIN - FUNCTIONAL ASSESSMENT: PAIN_FUNCTIONAL_ASSESSMENT: 0-10

## 2024-08-30 NOTE — OP NOTE
Operative Note      Patient: Rosa Ugarte  YOB: 1945  MRN: [id number]    Date of Procedure: 8/30/2024    Pre-Op Diagnosis Codes:      * Bilateral renal stones [N20.0]    Post-Op Diagnosis: Same       Procedure(s):  RIGHT URETEROSCOPY, LEFT HOLMIUM LASER URETEROSCOPY AND STENT PLACEMENT  Cystoscopy  Surgeon(s):  Joey Francois MD    Assistant:   * No surgical staff found *    Anesthesia: General    Estimated Blood Loss (mL): Minimal    Complications: None    Specimens:   * No specimens in log *    Implants:  Implant Name Type Inv. Item Serial No.  Lot No. LRB No. Used Action   STENT URET 6FR L24CM PERCFLX HYDR+ DBL PGTL THRD 2 - [id number]  STENT URET 6FR L24CM PERCFLX HYDR+ DBL PGTL THRD 2  [employer] [facility]- [id number] Right 1 Implanted         Drains: * No LDAs found *    Findings:  Infection Present At Time Of Surgery (PATOS) (choose all levels that have infection present):  No infection present  Other Findings: see below     Detailed Description of Procedure:   INDICATIONS FOR PROCEDURE:  Rosa Ugarte is a 79 y.o. female presents for bilateral obstructing ureteral stones: 7 mm on the left and 3 mm on the right.     After the risks, benefits, alternatives, of the procedure were discussed with the patient, informed consent was obtained.  The patient elected to proceed.  Patient given Rocephin IV 1 gm on the inpatient floor prior to the OR.     DETAILS OF THE PROCEDURE:  The patient was brought back from the preoperative holding area to the  operating suite, and was transferred to the operating table where the patient lay in  supine position. EPC's were in place, connected to the machine and the machine was turned on before induction.  General endotracheal anesthesia was induced, and patient was prepped and draped in standard surgical fashion after being placed in dorsolithotomy position. A proper timeout was performed, preoperative antibiotics were given. We began by  inserting a cystoscope with a 22 Uzbek sheath and 30 degree lens into the patient's urethral meatus and advancing into the bladder without complication.  A pan cystoscopy was preformed and the bladder appeared unremarkable.    Left side  We then focused our attention on the Left ureteral orifice, which we cannulated with our glidewire, advanced up to renal pelvis.  We then used a dual lumen catheter to place a second wire.  Once in good position, we advanced our flexible ureteroscope over the working wire to the stone in the proximal ureter under direct visualization.  We identified the calculus and using a 365 micron holmium laser fiber we fragmented the calculus.  It was dusted and the fragments appeared to be under 1 millimeter and could pass easily.  At this time a complete pyeloscopy was preformed and no other substantial fragments were identified within the ureter or renal pelvis. We withdrew the ureteroscope and visualized the entire ureter.  No stone fragments were identified.  No damage to the ureter was identified.  At this time, over the remaining glidewire, we placed a [id number] double J ureteral stent in the usual fashion, and we noted appropriate placement in the upper collecting system using fluoroscopy.  There was a good curl noted in the bladder.  We decided to leave a string at the end of the stent, which was attached with benzoin and steri-strips.   Right side  We then focused our attention on the Right ureteral orifice, which we cannulated with our glidewire, advanced up to renal pelvis.  We then used a dual lumen catheter to place a second wire.  Once in good position, we advanced our flexible ureteroscope over the working wire and up to the renal pelvis under direct visualization.  No stone fragments were identified within the ureter.  Thus, no ureteral stent was left within the right ureter.    The patient's bladder was drained and removed the scope and the procedure was subsequently

## 2024-08-30 NOTE — ANESTHESIA POSTPROCEDURE EVALUATION
Department of Anesthesiology  Postprocedure Note    Patient: Rosa Ugarte  MRN: [id number]  YOB: 1945  Date of evaluation: 8/30/2024    Procedure Summary       Date: 08/30/24 Room / Location: [address]    Anesthesia Start: 1251 Anesthesia Stop: 1335    Procedure: RIGHT URETEROSCOPY, LEFT HOLMIUM LASER URETEROSCOPY AND STENT PLACEMENT (Bilateral: Ureter) Diagnosis:       Bilateral renal stones      (Bilateral renal stones [N20.0])    Surgeons: Joey Francois MD Responsible Provider: Ritesh Pierce MD    Anesthesia Type: general ASA Status: 3            Anesthesia Type: No value filed.    Tony Phase I: Tony Score: 8    Tony Phase II:      Anesthesia Post Evaluation    Patient location during evaluation: PACU  Patient participation: complete - patient participated  Level of consciousness: awake and alert  Airway patency: patent  Nausea & Vomiting: no nausea and no vomiting  Cardiovascular status: blood pressure returned to baseline  Respiratory status: acceptable and room air  Hydration status: euvolemic  Pain management: adequate and satisfactory to patient    No notable events documented.

## 2024-08-30 NOTE — CONSULTS
Today's Date: 8/30/2024  Patient Name: Rosa Ugarte  Date of admission: 8/29/2024  8:23 PM  Patient's age: 79 y.o., 1945  Admission Dx: Nephrolithiasis [N20.0]  Urinary tract obstruction by kidney stone [N20.0, N13.8]    Reason for Consult: management recommendations  Requesting Physician: Caitlin Gerard MD    CHIEF COMPLAINT: Pancreatic mass    History Obtained From:  patient    HISTORY OF PRESENT ILLNESS:      The patient is a 79 y.o.   female who is admitted to the hospital for pancreatic mass incidentally found.  The patient was evaluated for multiple kidney stones and CT scan was done showing nephrolithiasis.  Incidentally though there was a mass at the head of pancreas measuring at least 2.7 cm.  There was also dilatation of the pancreatic duct.  The patient is seen and evaluated.  She states that she is not losing weight.  No nausea or vomiting, she has minimal abdominal pain but she was attributing this to the kidney stones.  She has mild dysuria but no hematuria.  Patient had previous history of left-sided breast cancer, early stage, status postlumpectomy radiation and adjuvant chemotherapy.  She does not remember if she took hormone treatment or not.  Past Medical History:   has a past medical history of Arthritis, Autologous donor of stem cells, Caffeine use, Calcium nephrolithiasis, Cancer (HCC), Cataract, right eye, Hemorrhoids, History of breast cancer, History of breast cancer, History of kidney stones, Hypercholesteremia, Hyperlipidemia, Hypertension, Kidney stones, Migraines, Osteoporosis, Pneumonia, and Vitamin D deficiency.    Past Surgical History:   has a past surgical history that includes Breast lumpectomy (Left, 12/2001); Tubal ligation; Cardiac catheterization (2012); Cystoscopy (2013); Colonoscopy (10/2015); Lithotripsy (Left, 12/11/2015); eye surgery (Right); eye surgery (Right, 08/2017); hip surgery; Hip Arthroplasty (Right, 01/20/2020); Colonoscopy (04/2022); Cystoscopy

## 2024-08-30 NOTE — ANESTHESIA PRE PROCEDURE
Left 5/19/2023    CYSTOSCOPY LEFT URETEROSCOPY WITH LEFT STENT PLACEMENT LASER ON STANDBY performed by Joey Francois MD at [facility]   • CYSTOSCOPY W/ URETERAL STENT PLACEMENT Left 05/19/2023    CYSTOSCOPY LEFT URETEROSCOPY HOLMIUM LASER WITH LEFT STENT PLACEMENT   • EYE SURGERY Right     cataract   • EYE SURGERY Right 08/2017    lasar right eye for glaucoma   • HIP ARTHROPLASTY Right 01/20/2020    Dr Self   • HIP SURGERY      stem [place]  6-2017   • LITHOTRIPSY Left 12/11/2015   • TUBAL LIGATION      pt does not recall procedure       Social History:    Social History     Tobacco Use   • Smoking status: Never   • Smokeless tobacco: Never   Substance Use Topics   • Alcohol use: No     Alcohol/week: 0.0 standard drinks of alcohol                                Counseling given: Not Answered      Vital Signs (Current):   Vitals:    08/29/24 2230 08/29/24 2351 08/30/24 0829 08/30/24 1108   BP:  119/85 (!) 136/91 (!) 155/83   Pulse: 77 78 70 73   Resp:  16 18 20   Temp:  98 °F (36.7 °C) 98.4 °F (36.9 °C) 97.4 °F (36.3 °C)   TempSrc:  Oral Oral Infrared   SpO2:  96% 95% 94%   Weight:    64.9 kg (143 lb)   Height:    1.626 m (5' 4\")                                              BP Readings from Last 3 Encounters:   08/30/24 (!) 155/83   08/29/24 130/67   08/05/24 138/80       NPO Status: Time of last liquid consumption: 0800 (sips of water)                        Time of last solid consumption: 2000                        Date of last liquid consumption: 08/30/24                        Date of last solid food consumption: 08/29/24    BMI:   Wt Readings from Last 3 Encounters:   08/30/24 64.9 kg (143 lb)   08/29/24 64 kg (141 lb)   08/05/24 65.3 kg (144 lb)     Body mass index is 24.55 kg/m².    CBC:   Lab Results   Component Value Date/Time    WBC 10.9 08/29/2024 03:05 PM    RBC 4.38 08/29/2024 03:05 PM    HGB 14.1 08/29/2024 03:05 PM    HCT 42.1 08/29/2024 03:05 PM    MCV 96.3 08/29/2024 03:05 PM    RDW 13.6

## 2024-08-30 NOTE — DISCHARGE SUMMARY
[facility]  Office: [phone]  Car Nath DO, Tim Mac DO, Myke España DO, Chuck Prasad DO, Kanu Carranza MD, Caitlin Gerard MD, Rafael Guzman MD, Zoe Tyler MD,  Juliano Posey MD, Tristan Aguirre MD, Les Galan MD,  Ana Colin DO, Tiffanie Ramirez MD, Preston Matthews MD, Uday Nath DO, Amanda Galvez MD,  Tacos Pillai DO, Patrizia Colindres MD, Karina Deras MD, Daniela Gibbons MD, Kamini Puga MD,  Derick Duke MD, Yolande Ravi MD, Issa Nugent MD, Gurjit Welch MD, Leobardo Blue MD, Gabino Colorado MD, Dario Almanza DO, Imtiaz Becker DO, Ad Riley DO, Kylee Benítez MD,  Michel Lin MD, Shirley Waterhouse, CNP,  Alayna Ornelas, CNP, Dario Bass, CNP,  Heidi Peñaloza, DNP, Celia Boo, CNP, Joanna Canales, CNP, Anita Verma, CNP, Geneva Abdullahi, CNP, Lindsay Fraire, PA-C, Asia Eastman, PA-C, Sangita Giang, CNP, Reginald Coyle, CNP,  Tamiko Crump, CNP, Carla Garcia, CNP, Leidy Collado, CNP, Susanne Suarez, CNS, Melinda Conn, CNP, Roshni Jones, CNP, Tracy Schwab, CNP         [facility]   IN-PATIENT SERVICE   [facility]    Discharge Summary     Patient ID: Rosa Ugarte  :  1945   MRN: [id number]     ACCOUNT:  [id number]   Patient's PCP: Imtiaz Vásquez MD  Admit Date: 2024   Discharge Date: 2024    Length of Stay: 1  Code Status:  Full Code  Admitting Physician: Caitlin Gerard MD  Discharge Physician: Caitlin Gerard MD     Active Discharge Diagnoses:     Hospital Problem Lists:  Principal Problem:    Nephrolithiasis  Active Problems:    Right ureteral calculus    Hypercholesterolemia with hypertriglyceridemia    Mild dementia without behavioral disturbance, psychotic disturbance, mood disturbance, or anxiety (HCC)    Urinary tract obstruction by kidney stone    Pancreatic mass    Primary hypertension  Resolved Problems:    * No resolved hospital problems. *      Admission  measures 9.5 mm.  Pancreatic cancer likely.  No obvious liver metastases. 2. Bilateral nephrolithiasis and hydronephrosis. A left upper ureteric stone measures 7.1 mm. A distal right ureteric stone measures 2.9 mm. 3. Diverticulosis, but no acute diverticulitis.       Consultations:    Consults:     Final Specialist Recommendations/Findings:   IP CONSULT TO UROLOGY  IP CONSULT TO ONCOLOGY  IP CONSULT TO GI      The patient was seen and examined on day of discharge and this discharge summary is in conjunction with any daily progress note from day of discharge.    Discharge plan:     Disposition: {DISPOSITIONS:146588875}    Physician Follow Up:   ***  Imtiaz Vásquez MD  [address]  [facility] [place] [address]  [phone]          Joey Francois MD  [address]  [facility] [address] [phone]    Follow up in 6 week(s)      Stephanie Ramos MD  [address]  [facility] [address]  [phone]    Schedule an appointment as soon as possible for a visit in 2 week(s)  fna of pancreas       Requiring Further Evaluation/Follow Up POST HOSPITALIZATION/Incidental Findings: ***    Diet: {diet:34660}    Activity: As tolerated***    Instructions to Patient: ***    Discharge Medications:      Medication List        START taking these medications      cefUROXime 250 MG tablet  Commonly known as: CEFTIN  Take 1 tablet by mouth 2 times daily for 5 days            CONTINUE taking these medications      ammonium lactate 12 % lotion  Commonly known as: LAC-HYDRIN     atenolol 25 MG tablet  Commonly known as: TENORMIN  TAKE 1 TABLET BY MOUTH EVERY DAY     calcium citrate 950 (200 Ca) MG tablet  Commonly known as: CALCITRATE     CoQ10 200 MG Caps  Take 1 capsule by mouth daily     fish oil 1000 MG Cpdr     linaclotide 145 MCG capsule  Commonly known as: Linzess  Take 1 capsule by mouth every morning (before breakfast)     memantine 5 MG tablet  Commonly known as: NAMENDA  Take 1 tablet by  mouth 2 times daily     ondansetron 4 MG disintegrating tablet  Commonly known as: ZOFRAN-ODT     ONE-A-DAY WOMENS 50 PLUS PO     pravastatin 80 MG tablet  Commonly known as: PRAVACHOL  Take 1 tablet by mouth daily     tamsulosin 0.4 MG capsule  Commonly known as: FLOMAX  Take 1 capsule by mouth daily Take one capsule daily to facilitate passage of ureteral stone     timolol 0.5 % ophthalmic solution  Commonly known as: TIMOPTIC     vitamin D-3 125 MCG (5000 UT) Tabs  Commonly known as: cholecalciferol     Vyzulta 0.024 % Soln  Generic drug: Latanoprostene Bunod               Where to Get Your Medications        These medications were sent to [id number] - [place], [place] - [address] - P [phone] - F [phone]  [address] [address]      Phone: [phone]   cefUROXime 250 MG tablet         No discharge procedures on file.    Time Spent on discharge is  {Blank single:41739::\"15 mins\",\"17 mins\",\"20 mins\",\"31 mins\",\"32 mins\",\"33 mins\",\"34 mins\",\"35 mins\",\"36 mins\",\"37 mins\",\"38 mins\",\"39 mins\",\"40 mins\",\"41 mins\",\"43 mins\",\"45 mins\",\"50 mins\",\"***\"} in patient examination, evaluation, counseling as well as medication reconciliation, prescriptions for required medications, discharge plan and follow up.    Electronically signed by   Caitlin Gerard MD  8/30/2024  6:06 PM      Thank you Imtiaz Ashton MD for the opportunity to be involved in this patient's care.

## 2024-08-30 NOTE — H&P
[facility]  Office: [phone]  Car Nath DO, Tim Mac DO, Myke España DO, Chuck Prasad DO, Kanu Carranza MD, Caitlin Gerard MD, Rafael Guzman MD, Zoe Tyler MD,  Juliano Posey MD, Tristan Aguirre MD, Les Galan MD,  Ana Colin DO, Tiffanie Ramirez MD, Preston Matthews MD, Uday Nath DO, Amanda Galvez MD,  Tacos Pillai DO, Patrizia Colindres MD, Karina Deras MD, Daniela Gibbons MD, Kamini Puga MD,  Derick Duke MD, Yolande Ravi MD, Issa Nugent MD, Gurjit Welch MD, Leobardo Blue MD, Gabino Colorado MD, Dario Almanza DO, Imtiaz Becker DO, Ad Riley DO, Kylee Benítez MD,  iMchel Lin MD, Shirley Waterhouse, CNP,  Alayna Ornelas, CNP, Dario Bass, CNP,  Heidi Peñaloza, DNP, Celia Boo, CNP, Joanna Canales, CNP, Anita Verma, CNP, Geneva Abdullahi, CNP, Lindsay Fraire, PA-C, Asia Eastman, PA-C, Sangita Giang, CNP, Reginald Coyle, CNP,  Tamiko Crump, CNP, Carla Garcia, CNP, Leidy Collado, CNP, Susanne Suarez, CNS, Melinda Conn, CNP, Roshni Jones, CNP, Tracy Schwab, CNP         [facility]   IN-PATIENT SERVICE   [facility]    HISTORY AND PHYSICAL EXAMINATION            Date:   8/29/2024  Patient name:  Rosa Ugarte  Date of admission:  8/29/2024  8:23 PM  MRN:   [id number]  Account:  [id number]  YOB: 1945  PCP:    Imtiaz Vásquez MD  Room:   [address] [place] [address]  Code Status:    Full Code    Chief Complaint:     No chief complaint on file.    History Obtained From:     patient, electronic medical record    History of Present Illness:     Rosa Ugarte is a 79 y.o. Non- / non  female who presents with No chief complaint on file.   and is admitted to the hospital for the management of Nephrolithiasis.    Patient is a very pleasant 79-year-old female who was admitted for management of nephrolithiasis.  She states that she had left-sided abdominal pain that she first noted

## 2024-08-30 NOTE — CONSULTS
[facility]    GASTROENTEROLOGY CONSULT    Patient:   Rosa Ugarte   :    1945   Facility:   [facility]  Date:    2024  Admission Dx:  Nephrolithiasis [N20.0]  Urinary tract obstruction by kidney stone [N20.0, N13.8]  Requesting physician: Caitlin Gerard MD  Reason for consult:  Pancreatic mass      SUBJECTIVE:  History of Present Illness:  This is a 79 y.o.   female who was admitted 2024 with Nephrolithiasis [N20.0]  Urinary tract obstruction by kidney stone [N20.0, N13.8]. We have been asked to see the patient in consultation by Caitlin Gerard MD for  pancreatic mass. This is a 79 year old female with pmh of breast cancer decades ago treated with surgery chemo/radiation kidney stones htn hld who presented to the ED for left sided abdominal pain. She was found to have nephrolithiasis with hydronephrosis and is s/p cysto with stent placement  and holmium laser tx today. Incidental finding of pancreatic head mass 2.7 cm on abdominal imaging. She denies prior pancreatic disease. She has not had upper abdominal pain weight loss dysphagia change in the  bowel habits fevers chills melena hematemesis or hematochezia.     Labs show creat 1.0 no leucocytosis or anemia plt normal  alk phos 119 lipase 135 rest of lft are normal inr 0.9    Family hx no hx of liver pancreatic stomach or colon cancer no uc/crohns  Endoscopy hx no egd states colonoscopy years ago has hx of hemorrhoids. 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 Authorizing Provider   atenolol (TENORMIN) 25 MG tablet TAKE 1 TABLET BY MOUTH EVERY DAY 8/12/24  Yes Imtiaz Vásquez MD   memantine (NAMENDA) 5 MG tablet Take 1 tablet by mouth 2 times daily 5/21/24  Yes Imtiaz Vásquez MD   pravastatin (PRAVACHOL) 80 MG tablet Take 1 tablet by mouth daily 2/19/24  Yes Imtiaz Vásquez MD   timolol (TIMOPTIC) 0.5 % ophthalmic solution INSTILL 1 DROP INTO BOTH EYES TWICE DAILY 11/18/23  Yes Minh Dewitt MD   Multiple Vitamins-Minerals (ONE-A-DAY WOMENS 50 PLUS PO) Take by mouth    Yes Minh Dewitt MD   Omega-3 Fatty Acids (FISH OIL) 1000 MG CPDR Take 1 capsule by mouth daily   Yes Minh Dewitt MD   VYZULTA 0.024 % SOLN Place 1 drop into both eyes nightly  3/22/19  Yes Minh Dewitt MD   Coenzyme Q10 (COQ10) 200 MG CAPS Take 1 capsule by mouth daily 11/22/16  Yes Melidna Ibrahim MD   vitamin D-3 (CHOLECALCIFEROL) 5000 UNITS TABS Take 1 tablet by mouth daily   Yes Minh Dewitt MD   calcium citrate (CALCITRATE) 950 MG tablet Take 1 tablet by mouth daily   Yes Minh Dewitt MD   tamsulosin (FLOMAX) 0.4 MG capsule Take 1 capsule by mouth daily Take one capsule daily to facilitate passage of ureteral stone 8/29/24   Joey Francois MD   ondansetron (ZOFRAN-ODT) 4 MG disintegrating tablet  5/3/23   Minh Dewitt MD   linaclotide (LINZESS) 145 MCG capsule Take 1 capsule by mouth every morning (before breakfast) 12/19/22   Imtiaz Vásquez MD   ammonium lactate (LAC-HYDRIN) 12 % lotion APPLY ONCE DAILY, NECK DOWN, AFTER BATHING,  FOR DRY SKIN 7/21/22   Minh Dewitt MD       CURRENT MEDICATIONS:  Scheduled Meds:   sodium chloride flush  5-40 mL IntraVENous 2 times per day    sodium chloride flush  5-40 mL IntraVENous 2  adrenals, aorta and IVC appear  stable.  Pancreatic head mass measures 2.7 x 2.6 cm, which appears hypodense  and is also associated with pancreatic duct obstruction and atrophy of the  pancreas.  Pancreatic duct measures 7.9 mm.  Common bile measures 9.5 mm.  Both kidneys demonstrate stones and hydronephrosis.  A left upper ureteric  stone measures 7.1 mm.  A distal right ureteric stone measures 2.9 mm.  Bilateral nephrolithiasis with stones in the lower pole calices measuring up  to 6.1 mm.  Punctate right nephrolithiasis.     GI/Bowel: No bowel obstruction or perforation.  Diverticulosis, but no acute  diverticulitis.  The urinary bladder and uterus appear unremarkable.     Pelvis: The uterus, adnexa and urinary bladder appear unremarkable.  No  lymphadenopathy.     Peritoneum/Retroperitoneum: No retroperitoneal lymphadenopathy or acute  mesenteric findings.     Bones/Soft Tissues: Lumbar spine and sacrum appear stable.     IMPRESSION:  1. Pancreatic head mass measures 2.7 x 2.6 cm which appears hypodense and is  also associated with pancreatic duct obstruction and atrophy of the pancreas.  Pancreatic duct measures 7.9 mm. Common bile measures 9.5 mm.  Pancreatic  cancer likely.  No obvious liver metastases.  2. Bilateral nephrolithiasis and hydronephrosis. A left upper ureteric stone  measures 7.1 mm. A distal right ureteric stone measures 2.9 mm.  3. Diverticulosis, but no acute diverticulitis.         IMPRESSION/plan  1. Abdominal pain with imaging showing nephrolithiasis and hydronephrosis , incidental finding of pancreatic mass with pancreatic ductal dilatation ; no hx of pancreatic disease  -ca 19-9 results are pending  -oncology following  -will need outpt follow up with dr gaspar , dr manriquez or dr hyde from [facility] due to insurance issues for eus with bx (unable to do this at [place])  Is s/p cysto with stent placement  and holmium laser  tx today    Message sent to dr zeyad Sherman signing off    This

## 2024-08-30 NOTE — PROGRESS NOTES
Spiritual Health Assessment/Progress Note  [facility]    Spiritual/Emotional Needs,  ,  ,      Name: Rosa Ugarte MRN: [id number]    Age: 79 y.o.     Sex: female   Language: English   Catholic: Confucianist   Nephrolithiasis     Date: 8/30/2024            Total Time Calculated: (P) 10 min              Spiritual Assessment began in [facility]        Referral/Consult From: Rounding   Encounter Overview/Reason: Spiritual/Emotional Needs  Service Provided For: Patient    Writer met w/ pt in pt's room. Pt shared that she will be going for surgery later today. Pt expressed feeling \"hopeful\" and looking forward to going home after surgery. Pt shared about activities that are meaningful to her including quilting and spending time with family. Pt also shared about being a member of [employer]ist [employer]. Pt welcomed prayer.     Gloria, Belief, Meaning:   Patient is connected with a gloria tradition or spiritual practice  Family/Friends No family/friends present      Importance and Influence:  Patient has spiritual/personal beliefs that influence decisions regarding their health  Family/Friends no family/friends present    Community:  Patient is connected with a spiritual community and feels well-supported. Support system includes: Spouse/Partner, Children, and Extended family  Family/Friends Other: no family/friends present    Assessment and Plan of Care:     Patient Interventions include: Facilitated expression of thoughts and feelings, Affirmed coping skills/support systems, Provided sacramental/Yarsanism ritual, and Engaged in life review and/or legacy  Family/Friends Interventions include: Other: no family/friends present    Patient Plan of Care: No future visits per patient/family request  Family/Friends Plan of Care: No future visits per patient/family request    Electronically signed by Chaplain Santa on 8/30/2024 at 10:41 AM

## 2024-08-30 NOTE — PLAN OF CARE
Problem: Discharge Planning  Goal: Discharge to home or other facility with appropriate resources  Outcome: Progressing     Problem: Safety - Adult  Goal: Free from fall injury  Outcome: Progressing     Problem: Pain  Goal: Verbalizes/displays adequate comfort level or baseline comfort level  Outcome: Progressing  Flowsheets  Taken 8/30/2024 1415 by Mirella Cueto, MORAIMA  Verbalizes/displays adequate comfort level or baseline comfort level:   Assess pain using appropriate pain scale   Encourage patient to monitor pain and request assistance  Taken 8/30/2024 1400 by Mirella Cueto, RN  Verbalizes/displays adequate comfort level or baseline comfort level:   Assess pain using appropriate pain scale   Encourage patient to monitor pain and request assistance  Taken 8/30/2024 1345 by Mirella Cueto, MORAIMA  Verbalizes/displays adequate comfort level or baseline comfort level:   Assess pain using appropriate pain scale   Encourage patient to monitor pain and request assistance

## 2024-08-30 NOTE — CARE COORDINATION
Case Management Assessment  Initial Evaluation    Date/Time of Evaluation: 8/30/2024 10:17 AM  Assessment Completed by: Marva Carrillo    If patient is discharged prior to next notation, then this note serves as note for discharge by case management.    Patient Name: Rosa Ugarte                   YOB: 1945  Diagnosis: Nephrolithiasis [N20.0]  Urinary tract obstruction by kidney stone [N20.0, N13.8]                   Date / Time: 8/29/2024  8:23 PM    Patient Admission Status: Inpatient   Readmission Risk (Low < 19, Mod (19-27), High > 27): Readmission Risk Score: 9.2    Current PCP: Imtiaz Vásquez MD  PCP verified by CM? Yes    Chart Reviewed: Yes      History Provided by: Patient  Patient Orientation: Alert and Oriented    Patient Cognition: Alert    Hospitalization in the last 30 days (Readmission):  No    If yes, Readmission Assessment in CM Navigator will be completed.    Advance Directives:      Code Status: Full Code   Patient's Primary Decision Maker is: Legal Next of Kin ()      Discharge Planning:    Patient lives with: Spouse/Significant Other Type of Home: Other (Comment) (hanna)  Primary Care Giver: Self  Patient Support Systems include: Spouse/Significant Other   Current Financial resources: Medicare  Current community resources:  (TBD)  Current services prior to admission: None            Current DME:              Type of Home Care services:  None    ADLS  Prior functional level: Independent in ADLs/IADLs  Current functional level: Independent in ADLs/IADLs    PT AM-PAC:   /24  OT AM-PAC:   /24    Family can provide assistance at DC: Yes  Would you like Case Management to discuss the discharge plan with any other family members/significant others, and if so, who? No  Plans to Return to Present Housing: Yes  Other Identified Issues/Barriers to RETURNING to current housing: none  Potential Assistance needed at discharge:  (TBD)            Potential DME:    Patient

## 2024-08-30 NOTE — PROGRESS NOTES
[facility]  Office: [phone]  Car Nath, DO, Tim Mac, DO, Myke España DO, Chuck Prasad, DO, Kanu Carranza MD, Caitlin Gerard MD, Rafael Guzman MD, Zoe Tyler MD,  Juliano Posey MD, Tristan Aguirre MD, Les Galan MD,  Ana Colin DO, Tiffanie Ramirez MD, Preston Matthews MD, Uday Nath DO, Amanda Galvez MD,  Tacos Pillai DO, Patrizia Colindres MD, Karina Deras MD, Daniela Gibbons MD, Kamini Puga MD,  Derick Duke MD, Yolande Ravi MD, Issa Nugent MD, Gurjit Welch MD, Leobardo Blue MD, Gabino Colorado MD, Dario Almanza DO, Imtiaz Becker DO, Ad Riley DO, Kylee Benítez MD,  Michel Lin MD, Shirley Waterhouse, CNP,  Alayna Ornelas, CNP, Dario Bass, CNP,  Heidi Peñaloza, DNP, Celia Boo, CNP, Joanna Canales, CNP, Anita Verma, CNP, Geneva Abdullahi, CNP, Lindsay Fraire, PA-C, Asia Eastman, PA-C, Sangita Giang, CNP, Reginald Coyle, CNP,  Tamiko Crump, CNP, Carla Garcia, CNP, Leidy Collado, CNP, Susanne Suarez, CNS, Melinda Conn, CNP, Roshni Jones CNP, Tracy Schwab, CNP         [facility]   IN-PATIENT SERVICE   [facility]    Progress Note    8/30/2024    8:21 AM    Name:   Rosa Ugarte  MRN:     [id number]     Acct:      [id number]   Room:   342/342-01  IP Day:  1  Admit Date:  8/29/2024  8:23 PM    PCP:   Imtiaz Vásquez MD  Code Status:  Full Code    Subjective:     C/C: abdominal pain    Interval History Status: improved     Patient is s/p cystoscopy with lithotripsy and stent placement of the 7mm left ureteral stone.  She has a string in place.  She is a little nauseated, abdominal pain improved.  No vomiting.  No fevers.      Brief History:     Per the NP:  \"Rosa Ugarte is a 79 y.o. Non- / non  female who presents with No chief complaint on file.   and is admitted to the hospital for the management of Nephrolithiasis.     Patient is a very pleasant 79-year-old female         cornea problems.     Dementia Maternal Aunt     Heart Disease Paternal Uncle 40        mi    Dementia Maternal Grandmother     Heart Disease Maternal Grandfather 78         mi    Other Son         nephrolithiasis/uric acid    High Cholesterol Daughter        Vitals:  /85   Pulse 78   Temp 98 °F (36.7 °C) (Oral)   Resp 16   Ht 1.626 m (5' 4.02\")   Wt 64.4 kg (142 lb)   SpO2 96%   BMI 24.36 kg/m²   Temp (24hrs), Av.1 °F (36.7 °C), Min:98 °F (36.7 °C), Max:98.2 °F (36.8 °C)    No results for input(s): \"POCGLU\" in the last 72 hours.    I/O (24Hr):    Intake/Output Summary (Last 24 hours) at 2024 0821  Last data filed at 2024 0654  Gross per 24 hour   Intake 674.88 ml   Output --   Net 674.88 ml       Labs:  Hematology:  Recent Labs     24  1505 24  0612   WBC 10.9  --    RBC 4.38  --    HGB 14.1  --    HCT 42.1  --    MCV 96.3  --    MCH 32.2  --    MCHC 33.4  --    RDW 13.6  --      --    MPV 7.7  --    INR  --  0.9     Chemistry:  Recent Labs     24  1505 24  0612    141   K 4.3 4.1    108*   CO2 26 24   GLUCOSE 105* 109*   BUN 33* 30*   CREATININE 0.9 1.0*   ANIONGAP 7* 9   LABGLOM 66 58*   CALCIUM 9.8 9.1     Recent Labs     24  1505   AST 27   ALT 29   ALKPHOS 119*   BILITOT 1.0   BILIDIR 0.2   AMYLASE 90   LIPASE 135*     ABG:No results found for: \"POCPH\", \"PHART\", \"PH\", \"POCPCO2\", \"UEF7RLM\", \"PCO2\", \"POCPO2\", \"PO2ART\", \"PO2\", \"POCHCO3\", \"YDE2YWH\", \"HCO3\", \"NBEA\", \"PBEA\", \"BEART\", \"BE\", \"THGBART\", \"THB\", \"RUM2DCJ\", \"LJKU0AFV\", \"O9SJRCNG\", \"O2SAT\", \"FIO2\"  Lab Results   Component Value Date/Time    SPECIAL NOT REPORTED 2016 10:48 AM     Lab Results   Component Value Date/Time    CULTURE NO SIGNIFICANT GROWTH 2023 10:39 PM       Radiology:  CT ABDOMEN PELVIS WO CONTRAST Additional Contrast? None    Result Date: 2024  1. Pancreatic head mass measures 2.7 x 2.6 cm which appears hypodense and is also associated with

## 2024-08-30 NOTE — DISCHARGE INSTR - COC
Continuity of Care Form    Patient Name: Rosa Ugarte   :  1945  MRN:  [id number]    Admit date:  2024  Discharge date:  2024    Code Status Order: Full Code   Advance Directives:   Advance Care Flowsheet Documentation        Date/Time Healthcare Directive Type of Healthcare Directive Copy in Chart Healthcare Agent Appointed Healthcare Agent's Name Healthcare Agent's Phone Number    24 1055 No, patient does not have an advance directive for healthcare treatment  --  --  --  --  --                     Admitting Physician:  Caitlin Gerard MD  PCP: Imtiaz Vásquez MD    Discharging Nurse: Debora FISCHER RN  Discharging Hospital Unit/Room#: 342/342-01  Discharging Unit Phone Number: [phone]    Emergency Contact:   Extended Emergency Contact Information  Primary Emergency Contact: Baldo Ugarte  Address: [address] [place] [address] of [facility]  Home Phone: [phone]  Mobile Phone: [phone]  Relation: Spouse    Past Surgical History:  Past Surgical History:   Procedure Laterality Date    BREAST LUMPECTOMY Left 2001    left axillary lymph nodes removal    CARDIAC CATHETERIZATION  2012    normal    COLONOSCOPY  10/2015    COLONOSCOPY  2022    Dr. Marrero - shay ck in 5 yr    CYSTOSCOPY  2013    normal    CYSTOSCOPY Left 2023    CYSTOSCOPY LEFT URETEROSCOPY FORTEC HOLMIUM LASER AND LEFT STENT PLACEMENT [id number] performed by Joey Francois MD at [facility] OR    CYSTOSCOPY Left 2023    CYSTOSCOPY LEFT URETEROSCOPY WITH LEFT STENT PLACEMENT LASER ON STANDBY performed by Joey Francois MD at [facility] OR    CYSTOSCOPY W/ URETERAL STENT PLACEMENT Left 2023    CYSTOSCOPY LEFT URETEROSCOPY HOLMIUM LASER WITH LEFT STENT PLACEMENT    EYE SURGERY Right     cataract    EYE SURGERY Right 2017    lasar right eye for glaucoma    HIP ARTHROPLASTY Right 2020    Dr Self    HIP SURGERY      [place]    N20.0    Urinary tract obstruction by kidney stone N20.0, N13.8    Pancreatic mass K86.89    Primary hypertension I10       Isolation/Infection:   Isolation            No Isolation          Patient Infection Status       None to display            Nurse Assessment:  Last Vital Signs: BP (!) 151/83   Pulse 76   Temp 98.2 °F (36.8 °C) (Oral)   Resp 16   Ht 1.626 m (5' 4\")   Wt 64.9 kg (143 lb)   SpO2 97%   BMI 24.55 kg/m²     Last documented pain score (0-10 scale): Pain Level: 0  Last Weight:   Wt Readings from Last 1 Encounters:   08/30/24 64.9 kg (143 lb)     Mental Status:  oriented and alert    IV Access:  - None    Nursing Mobility/ADLs:  Walking   Dependent  Transfer  Dependent  Bathing  Dependent  Dressing  Dependent  Toileting  Dependent  Feeding  Assisted  Med Admin  Assisted  Med Delivery   whole    Wound Care Documentation and Therapy:  Incision 05/19/23 Other (Comment) (Active)   Number of days: 469        Elimination:  Continence:   Bowel: No  Bladder: No  Urinary Catheter: None   Colostomy/Ileostomy/Ileal Conduit: No       Date of Last BM: 8/27/2024    Intake/Output Summary (Last 24 hours) at 8/30/2024 1635  Last data filed at 8/30/2024 1331  Gross per 24 hour   Intake 1174.88 ml   Output 10 ml   Net 1164.88 ml     I/O last 3 completed shifts:  In: 674.9 [I.V.:626.4; IV Piggyback:48.5]  Out: -     Safety Concerns:     At Risk for Falls    Impairments/Disabilities:      None    Nutrition Therapy:  Current Nutrition Therapy:   - Oral Diet:  Carb Control 4 carbs/meal (1800kcals/day) and Cardiac    Routes of Feeding: Oral  Liquids: No Restrictions  Daily Fluid Restriction: no  Last Modified Barium Swallow with Video (Video Swallowing Test): not done    Treatments at the Time of Hospital Discharge:   Respiratory Treatments: 2L NC  Oxygen Therapy:  is on oxygen at 2 L/min per nasal cannula.  Ventilator:    - No ventilator support    Rehab Therapies: N/A  Weight Bearing Status/Restrictions: No weight  bearing restrictions  Other Medical Equipment (for information only, NOT a DME order):  N/A  Other Treatments: N/A    Patient's personal belongings (please select all that are sent with patient):  Glasses    RN SIGNATURE:  Electronically signed by Sarina Mclaughlin RN on 8/30/24 at 4:36 PM EDT    CASE MANAGEMENT/SOCIAL WORK SECTION    Inpatient Status Date: 8/29/2024    Readmission Risk Assessment Score:  Readmission Risk              Risk of Unplanned Readmission:  11           Discharging to Facility/ Agency   Name:   Address:  Phone:  Fax:    Dialysis Facility (if applicable)   Name:  Address:  Dialysis Schedule:  Phone:  Fax:    / signature: {Esignature:[id number]}    PHYSICIAN SECTION    Prognosis: Good    Condition at Discharge: Stable    Rehab Potential (if transferring to Rehab): N/A    Recommended Labs or Other Treatments After Discharge: N/A    Physician Certification: I certify the above information and transfer of Rosa Ugarte  is necessary for the continuing treatment of the diagnosis listed and that she requires N/A for N/A 30 days.     Update Admission H&P: No change in H&P    PHYSICIAN SIGNATURE:  {Esignature:[id number]}

## 2024-08-30 NOTE — CONSULTS
[facility]  Joey Francois MD [facility]    Urology Consultation    Patient:  Rosa Ugarte  MRN: [id number]  YOB: 1945  Consult requested from BONNIE Molina for purpose of evaluation of bilateral ureteral calculi.    CHIEF COMPLAINT:  left flank pain    HISTORY OF PRESENT ILLNESS:   The patient is a 79 y.o. female who presents with:  Ureteral calculus:  Patient is here today for a ureteral calculus which was first noted 1 day(s) ago.    Location: left upper ureter and right lower ureter  Size: 7 and 3 mm  Recently the ureteral calculus symptoms: show no change  Current medical Rx for ureteral calculus: IV fluids, pain meds  Flank pain? Yes, left  Hematuria? microscopic  Passed recent calculus? No  Stone composition: unknown     Patient's old records, notes and chart reviewed and summarized above.    Past Medical History:    Past Medical History:   Diagnosis Date    Arthritis     Autologous donor of stem cells 06/27/2017    right him    Caffeine use     1 tea / day    Calcium nephrolithiasis 2003    Cancer (HCC)     Basal cell on nose and left leg    Cataract, right eye     Hemorrhoids     History of breast cancer 1/5/2015    History of breast cancer     2002 had radiation and chemo    History of kidney stones 1/5/2015    Hypercholesteremia 1/5/2015    Hyperlipidemia     Hypertension     Kidney stones     Migraines     Osteoporosis 1/5/2015    Pneumonia     Vitamin D deficiency 1/5/2015       Past Surgical History:    Past Surgical History:   Procedure Laterality Date    BREAST LUMPECTOMY Left 12/2001    left axillary lymph nodes removal    CARDIAC CATHETERIZATION  2012    normal    COLONOSCOPY  10/2015    COLONOSCOPY  04/2022    Dr. Marrero - shay ck in 5 yr    CYSTOSCOPY  2013    normal    CYSTOSCOPY Left 05/05/2023    CYSTOSCOPY LEFT URETEROSCOPY FORTEC HOLMIUM LASER AND LEFT STENT PLACEMENT [id number] performed by [facility] MD [facility] OR     Never   Vaping Use    Vaping status: Never Used   Substance and Sexual Activity    Alcohol use: No     Alcohol/week: 0.0 standard drinks of alcohol    Drug use: No    Sexual activity: Not Currently   Other Topics Concern    Not on file   Social History Narrative    Not on file     Social Determinants of Health     Financial Resource Strain: Low Risk  (2024)    Overall Financial Resource Strain (CARDIA)     Difficulty of Paying Living Expenses: Not hard at all   Food Insecurity: No Food Insecurity (2024)    Hunger Vital Sign     Worried About Running Out of Food in the Last Year: Never true     Ran Out of Food in the Last Year: Never true   Transportation Needs: No Transportation Needs (2024)    PRAPARE - Transportation     Lack of Transportation (Medical): No     Lack of Transportation (Non-Medical): No   Physical Activity: Not on file   Stress: Not on file   Social Connections: Patient Declined (2024)    Social Connections ([facility] HRSN)     If for any reason you need help with day-to-day activities such as bathing, preparing meals, shopping, managing finances, etc., do you get the help you need?: Not on file   Intimate Partner Violence: Not on file   Housing Stability: Low Risk  (2024)    Housing Stability Vital Sign     Unable to Pay for Housing in the Last Year: No     Number of Times Moved in the Last Year: 0     Homeless in the Last Year: No       Family History:    Family History   Problem Relation Age of Onset    Dementia Mother     High Blood Pressure Mother     Stroke Mother     Cancer Father         prostate/bone    Urolithiasis Father     Cancer Brother         bladder    Arthritis Brother     Other Brother         cornea problems.     Dementia Maternal Aunt     Heart Disease Paternal Uncle 40        mi    Dementia Maternal Grandmother     Heart Disease Maternal Grandfather 78         mi    Other Son         nephrolithiasis/uric acid    High Cholesterol Daughter        Physical

## 2024-08-30 NOTE — PROGRESS NOTES
Herbal and Nutritional Product Restrictions      The following herbal, alternative, and/or nutritional/dietary supplement product(s) has been discontinued per [employer]/[facility] approved policy:    Coenzyme Q10 100 mg po daily    Please reorder upon discharge if appropriate.    Fish oil delayed release 1000 mg capsule daily is also an herbal supplement that can be reordered upon discharge    Thank you,  Cinthya Justice RPH  8/30/2024 2:55 PM

## 2024-09-04 DIAGNOSIS — N20.0 BILATERAL RENAL STONES: Primary | ICD-10-CM

## 2024-09-05 ENCOUNTER — TELEPHONE (OUTPATIENT)
Age: 79
End: 2024-09-05

## 2024-09-05 NOTE — TELEPHONE ENCOUNTER
Patient called,  having rt sided pain similar to last week started an hour or so ago.  Pain level 1 or 2.  Please advise

## 2024-09-08 ENCOUNTER — HOSPITAL ENCOUNTER (OUTPATIENT)
Facility: CLINIC | Age: 79
Setting detail: SPECIMEN
Discharge: HOME | End: 2024-09-08
Payer: COMMERCIAL

## 2024-09-08 DIAGNOSIS — N20.0 BILATERAL RENAL STONES: ICD-10-CM

## 2024-09-08 LAB
CALCIUM UR-MCNC: 11.9 MG/DL (ref 100–300)
CALCIUM, URINE: 111 MG/24 H
COLLECT DURATION TIME SPEC: 24 H
CREATINE 24H UR-MRATE: 577 MG/24 H
CREATININE URINE: 61.7 MG/DL
SODIUM 24 HOUR URINE: 62 MMOL/24 H
SODIUM URINE: 66 MMOL/L
SPECIMEN VOL UR: 935 ML
URIC ACID 24 HOUR URINE: 260 MG/24 H (ref 250–750)
URIC ACID, UR: 27.8 MG/DL (ref 250–750)

## 2024-09-08 PROCEDURE — 83945 ASSAY OF OXALATE: CPT

## 2024-09-08 PROCEDURE — 82570 ASSAY OF URINE CREATININE: CPT

## 2024-09-08 PROCEDURE — 81050 URINALYSIS VOLUME MEASURE: CPT

## 2024-09-08 PROCEDURE — 83986 ASSAY PH BODY FLUID NOS: CPT

## 2024-09-08 PROCEDURE — 82507 ASSAY OF CITRATE: CPT

## 2024-09-08 PROCEDURE — 82340 ASSAY OF CALCIUM IN URINE: CPT

## 2024-09-08 PROCEDURE — 84560 ASSAY OF URINE/URIC ACID: CPT

## 2024-09-08 PROCEDURE — 84300 ASSAY OF URINE SODIUM: CPT

## 2024-09-11 LAB — PH UR STRIP: NORMAL [PH] (ref 5–8)

## 2024-09-13 LAB
CITRIC ACID, U, 24HR: 131 MG/D (ref 320–1240)
CITRIC ACID, URINE: 140 MG/L
CITRIC ACID/CREATININE RATIO URINE: 230 MG/G
COLLECT DURATION TIME SPEC: 24 H
COLLECT DURATION TIME SPEC: 24 H
CREAT 24H UR-MCNC: 61 MG/DL
CREAT 24H UR-MCNC: 61 MG/DL
CREATININE URINE /24 HR: 570 MG/D (ref 500–1400)
CREATININE URINE /24 HR: 570 MG/D (ref 500–1400)
OXALATE 24 HOUR URINE: 11 MG/D (ref 13–40)
OXALATE URINE: 12 MG/L
SPECIMEN VOL ?TM UR: 935 ML
SPECIMEN VOL ?TM UR: 935 ML

## 2024-10-14 ENCOUNTER — OFFICE VISIT (OUTPATIENT)
Age: 79
End: 2024-10-14
Payer: COMMERCIAL

## 2024-10-14 VITALS — BODY MASS INDEX: 24.41 KG/M2 | HEIGHT: 64 IN | WEIGHT: 143 LBS

## 2024-10-14 DIAGNOSIS — N20.0 BILATERAL RENAL STONES: Primary | ICD-10-CM

## 2024-10-14 DIAGNOSIS — N39.41 URGE INCONTINENCE: ICD-10-CM

## 2024-10-14 DIAGNOSIS — R31.29 MICROHEMATURIA: ICD-10-CM

## 2024-10-14 DIAGNOSIS — R82.991 HYPOCITRATURIA: ICD-10-CM

## 2024-10-14 PROCEDURE — 1123F ACP DISCUSS/DSCN MKR DOCD: CPT | Performed by: SPECIALIST

## 2024-10-14 PROCEDURE — 99214 OFFICE O/P EST MOD 30 MIN: CPT | Performed by: SPECIALIST

## 2024-10-14 RX ORDER — IBUPROFEN 200 MG
1 CAPSULE ORAL 2 TIMES DAILY
Qty: 180 TABLET | Refills: 3 | Status: SHIPPED | OUTPATIENT
Start: 2024-10-14

## 2024-10-14 NOTE — PROGRESS NOTES
time.    Assessment and Plan   Assessment   1. Bilateral renal stones    2. Hypocitraturia    3. Urge incontinence    4. Microhematuria            Plan    Return in about 6 months (around 4/14/2025) for KUB.  The patient presents after 8/20/24 Left ureteroscopy and Holmium laser lithotripsy.  Patient's 24 hour urine shows a urine volume of 0.9 L a day; it needs to be 2.5 L a day to decrease risk of future stones.  Patient told to drink at least 10 eight ounce glasses of water a day to decrease risk of recurrent kidney stones.   Patient's 24 hour urine shows a low urine citrate levels.    Patient told to drink lemonade daily to increase urinary citrate levels and to decrease risk of kidney stones (1/4 cup of real lemon juice in 16-20 ounces of water, sweetened to taste).    Will also start Ca citrate bid to help correct hypocitraturia.  Will check a KUB in 6 months.  Patient scheduled for pancreatic cancer surgery 10/31/24.         Joey Francois MD [facility]  10/14/2024 9:21 AM    2024 Quality Measure Documentation:   Patient discharged from a hospital, skilled nursing home or rehab facility within the past 30 days. His current medications were reconciled with his inpatient hospital discharge medications.   A.  Any medication patient taking prior to today's OV need to be stopped or altered?  No  B.  Any medication patient taking need to be suspended pending consultation with prescriber?  No  C.  Any new medication prescribed today?  Yes Ca citrate  Patient has an advance care plan and/or living will and the durable power of  (POA) is  Cesar.    Social History     Tobacco Use   Smoking Status Never   Smokeless Tobacco Never     (If patient a smoker, smoking cessation counseling offered)

## 2024-10-20 SDOH — HEALTH STABILITY: PHYSICAL HEALTH: ON AVERAGE, HOW MANY MINUTES DO YOU ENGAGE IN EXERCISE AT THIS LEVEL?: 10 MIN

## 2024-10-20 SDOH — HEALTH STABILITY: PHYSICAL HEALTH: ON AVERAGE, HOW MANY DAYS PER WEEK DO YOU ENGAGE IN MODERATE TO STRENUOUS EXERCISE (LIKE A BRISK WALK)?: 1 DAY

## 2024-10-23 ENCOUNTER — OFFICE VISIT (OUTPATIENT)
Dept: FAMILY MEDICINE CLINIC | Age: 79
End: 2024-10-23

## 2024-10-23 VITALS
TEMPERATURE: 97 F | HEIGHT: 63 IN | BODY MASS INDEX: 24.13 KG/M2 | HEART RATE: 79 BPM | OXYGEN SATURATION: 97 % | DIASTOLIC BLOOD PRESSURE: 80 MMHG | SYSTOLIC BLOOD PRESSURE: 133 MMHG | WEIGHT: 136.2 LBS

## 2024-10-23 DIAGNOSIS — Z86.0109 PERSONAL HISTORY OF OTHER COLON POLYPS: ICD-10-CM

## 2024-10-23 DIAGNOSIS — F03.A0 MILD DEMENTIA WITHOUT BEHAVIORAL DISTURBANCE, PSYCHOTIC DISTURBANCE, MOOD DISTURBANCE, OR ANXIETY, UNSPECIFIED DEMENTIA TYPE (HCC): ICD-10-CM

## 2024-10-23 DIAGNOSIS — Z87.442 HISTORY OF RENAL CALCULI: ICD-10-CM

## 2024-10-23 DIAGNOSIS — C25.9 MALIGNANT NEOPLASM OF PANCREAS, UNSPECIFIED LOCATION OF MALIGNANCY (HCC): ICD-10-CM

## 2024-10-23 DIAGNOSIS — I10 ESSENTIAL HYPERTENSION: ICD-10-CM

## 2024-10-23 DIAGNOSIS — Z85.3 HISTORY OF BREAST CANCER: ICD-10-CM

## 2024-10-23 DIAGNOSIS — E78.5 DYSLIPIDEMIA: Primary | ICD-10-CM

## 2024-10-23 PROBLEM — Z78.9 DIFFICULT INTRAVENOUS ACCESS: Status: ACTIVE | Noted: 2024-10-23

## 2024-10-23 SDOH — ECONOMIC STABILITY: FOOD INSECURITY: WITHIN THE PAST 12 MONTHS, YOU WORRIED THAT YOUR FOOD WOULD RUN OUT BEFORE YOU GOT MONEY TO BUY MORE.: NEVER TRUE

## 2024-10-23 SDOH — ECONOMIC STABILITY: INCOME INSECURITY: HOW HARD IS IT FOR YOU TO PAY FOR THE VERY BASICS LIKE FOOD, HOUSING, MEDICAL CARE, AND HEATING?: NOT HARD AT ALL

## 2024-10-23 SDOH — ECONOMIC STABILITY: FOOD INSECURITY: WITHIN THE PAST 12 MONTHS, THE FOOD YOU BOUGHT JUST DIDN'T LAST AND YOU DIDN'T HAVE MONEY TO GET MORE.: NEVER TRUE

## 2024-10-23 ASSESSMENT — PATIENT HEALTH QUESTIONNAIRE - PHQ9
SUM OF ALL RESPONSES TO PHQ QUESTIONS 1-9: 0
SUM OF ALL RESPONSES TO PHQ9 QUESTIONS 1 & 2: 0
SUM OF ALL RESPONSES TO PHQ QUESTIONS 1-9: 0
SUM OF ALL RESPONSES TO PHQ QUESTIONS 1-9: 0
2. FEELING DOWN, DEPRESSED OR HOPELESS: NOT AT ALL
1. LITTLE INTEREST OR PLEASURE IN DOING THINGS: NOT AT ALL
SUM OF ALL RESPONSES TO PHQ QUESTIONS 1-9: 0
SUM OF ALL RESPONSES TO PHQ QUESTIONS 1-9: 0
1. LITTLE INTEREST OR PLEASURE IN DOING THINGS: NOT AT ALL
SUM OF ALL RESPONSES TO PHQ QUESTIONS 1-9: 0
SUM OF ALL RESPONSES TO PHQ QUESTIONS 1-9: 0
SUM OF ALL RESPONSES TO PHQ9 QUESTIONS 1 & 2: 0
2. FEELING DOWN, DEPRESSED OR HOPELESS: NOT AT ALL
SUM OF ALL RESPONSES TO PHQ QUESTIONS 1-9: 0

## 2024-10-23 ASSESSMENT — ENCOUNTER SYMPTOMS
SHORTNESS OF BREATH: 0
SORE THROAT: 0
EYE PAIN: 0
COUGH: 0
PHOTOPHOBIA: 0
STRIDOR: 0
CONSTIPATION: 0
EYE REDNESS: 0
BACK PAIN: 0
EYE DISCHARGE: 0
ABDOMINAL PAIN: 0
BLOOD IN STOOL: 0
DIARRHEA: 0
VOMITING: 0
NAUSEA: 0

## 2024-10-23 ASSESSMENT — ANXIETY QUESTIONNAIRES
2. NOT BEING ABLE TO STOP OR CONTROL WORRYING: NOT AT ALL
1. FEELING NERVOUS, ANXIOUS, OR ON EDGE: NOT AT ALL
6. BECOMING EASILY ANNOYED OR IRRITABLE: NOT AT ALL
4. TROUBLE RELAXING: NOT AT ALL
GAD7 TOTAL SCORE: 0
5. BEING SO RESTLESS THAT IT IS HARD TO SIT STILL: NOT AT ALL
3. WORRYING TOO MUCH ABOUT DIFFERENT THINGS: NOT AT ALL
7. FEELING AFRAID AS IF SOMETHING AWFUL MIGHT HAPPEN: NOT AT ALL
IF YOU CHECKED OFF ANY PROBLEMS ON THIS QUESTIONNAIRE, HOW DIFFICULT HAVE THESE PROBLEMS MADE IT FOR YOU TO DO YOUR WORK, TAKE CARE OF THINGS AT HOME, OR GET ALONG WITH OTHER PEOPLE: NOT DIFFICULT AT ALL

## 2024-10-23 NOTE — PROGRESS NOTES
Subjective:      Patient ID: Rosa Ugarte is a 79 y.o. female.    States sees Dr Lyons regularly fr recurrent Renal stones B/L . Last month while doing imaging   Saw a mass in her pancreas - biopsy done by [facility]- not yet seen oncology   but plans on follow up with [facility] oncology.  Has surgery scheduled for removal of mass Oct 31 st. State sno sx . Appetite good, lost 5 lbs in   a month. Sleep has been ok. Appetite has been less than usual.  States keeps active.  Mood, sleep, anxiety.   Mild dementia diag 2 years ago- was started on namenda. States noticed some mild increased sx recently per   .  Never started aricept- will try after surgery is completed.    Review of Systems   Constitutional:  Negative for chills and fever.   HENT:  Negative for congestion, ear pain, hearing loss, nosebleeds, sore throat and tinnitus.    Eyes:  Negative for photophobia, pain, discharge and redness.   Respiratory:  Negative for cough, shortness of breath and stridor.    Cardiovascular:  Negative for chest pain, palpitations and leg swelling.   Gastrointestinal:  Negative for abdominal pain, blood in stool, constipation, diarrhea, nausea and vomiting.   Endocrine: Negative for polydipsia.   Genitourinary:  Negative for dysuria, flank pain, frequency, hematuria and urgency.   Musculoskeletal:  Negative for back pain, myalgias and neck pain.   Skin:  Negative for rash.   Allergic/Immunologic: Negative for environmental allergies.   Neurological:  Negative for dizziness, tremors, seizures, weakness and headaches.   Hematological:  Does not bruise/bleed easily.   Psychiatric/Behavioral:  Negative for hallucinations and suicidal ideas. The patient is not nervous/anxious.      Objective:   Physical Exam  Constitutional:       General: She is not in acute distress.     Appearance: She is well-developed.   HENT:      Head: Normocephalic and atraumatic.      Right Ear: External ear normal.      Left Ear:

## 2024-10-23 NOTE — PATIENT INSTRUCTIONS
Thank you for choosing [facility].  We know you have options when it comes to your healthcare; we appreciate that you chose us. Our goal is to provide exceptional  service and world class care to every patient.  You will be receiving a survey via email or text message asking for your feedback.  Please take a few minutes to share your thoughts about your recent visit. Your comments helps us understand what we do well and ways we can improve.  Thank you in advance for your valuable feedback.

## 2024-11-26 ENCOUNTER — HOSPITAL ENCOUNTER (OUTPATIENT)
Age: 79
Setting detail: SPECIMEN
Discharge: HOME | End: 2024-11-26

## 2024-11-26 LAB
ALBUMIN SERPL-MCNC: 3.4 G/DL (ref 3.5–5.2)
ALBUMIN/GLOB SERPL: 1.2 {RATIO} (ref 1–2.5)
ALP SERPL-CCNC: 331 U/L (ref 35–104)
ALT SERPL-CCNC: 109 U/L (ref 10–35)
ANION GAP SERPL CALCULATED.3IONS-SCNC: 16 MMOL/L (ref 9–16)
AST SERPL-CCNC: 77 U/L (ref 10–35)
BILIRUB SERPL-MCNC: 0.9 MG/DL (ref 0–1.2)
BUN SERPL-MCNC: 34 MG/DL (ref 8–23)
CALCIUM SERPL-MCNC: 8.8 MG/DL (ref 8.8–10.2)
CHLORIDE SERPL-SCNC: 87 MMOL/L (ref 98–107)
CO2 SERPL-SCNC: 31 MMOL/L (ref 20–31)
CREAT SERPL-MCNC: 0.6 MG/DL (ref 0.5–0.9)
ERYTHROCYTE [DISTWIDTH] IN BLOOD BY AUTOMATED COUNT: 15.3 % (ref 11.8–14.4)
GFR, ESTIMATED: >90 ML/MIN/1.73M2
GLUCOSE SERPL-MCNC: 116 MG/DL (ref 82–115)
HCT VFR BLD AUTO: 34.6 % (ref 36.3–47.1)
HGB BLD-MCNC: 11.2 G/DL (ref 11.9–15.1)
MAGNESIUM SERPL-MCNC: 2.4 MG/DL (ref 1.6–2.4)
MCH RBC QN AUTO: 31.9 PG (ref 25.2–33.5)
MCHC RBC AUTO-ENTMCNC: 32.4 G/DL (ref 28.4–34.8)
MCV RBC AUTO: 98.6 FL (ref 82.6–102.9)
NRBC BLD-RTO: 0 PER 100 WBC
PLATELET # BLD AUTO: 119 K/UL (ref 138–453)
PMV BLD AUTO: 11.2 FL (ref 8.1–13.5)
POTASSIUM SERPL-SCNC: 2.7 MMOL/L (ref 3.7–5.3)
PROT SERPL-MCNC: 6.2 G/DL (ref 6.6–8.7)
RBC # BLD AUTO: 3.51 M/UL (ref 3.95–5.11)
SODIUM SERPL-SCNC: 134 MMOL/L (ref 136–145)
WBC OTHER # BLD: 10.6 K/UL (ref 3.5–11.3)

## 2024-11-26 PROCEDURE — 83735 ASSAY OF MAGNESIUM: CPT

## 2024-11-26 PROCEDURE — 36415 COLL VENOUS BLD VENIPUNCTURE: CPT

## 2024-11-26 PROCEDURE — P9603 ONE-WAY ALLOW PRORATED MILES: HCPCS

## 2024-11-26 PROCEDURE — 85027 COMPLETE CBC AUTOMATED: CPT

## 2024-11-26 PROCEDURE — 80053 COMPREHEN METABOLIC PANEL: CPT

## 2024-12-02 ENCOUNTER — HOSPITAL ENCOUNTER (OUTPATIENT)
Age: 79
Setting detail: SPECIMEN
Discharge: HOME | End: 2024-12-02

## (undated) DEVICE — RENTAL LASER HOLMIUM LOW WATTAGE CASE

## (undated) DEVICE — STRIP,CLOSURE,WOUND,MEDI-STRIP,1/2X4: Brand: MEDLINE

## (undated) DEVICE — GLOVE ORANGE PI 8   MSG9080

## (undated) DEVICE — SOLUTION IRRIG 3000ML 0.9% SOD CHL USP UROMATIC PLAS CONT

## (undated) DEVICE — MHPB CYSTO PACK-LF: Brand: MEDLINE INDUSTRIES, INC.

## (undated) DEVICE — SYSTEM FLD COLL W/ FLX DRP SUPP AND DISP BG

## (undated) DEVICE — GUIDEWIRE URO L150CM DIA0.035IN STIFF NIT HYDRPHLC STR TIP

## (undated) DEVICE — SOLUTION IRRIG 1000ML STRL H2O USP PLAS POUR BTL

## (undated) DEVICE — NITINOL STONE RETRIEVAL BASKET: Brand: ZERO TIP

## (undated) DEVICE — Device

## (undated) DEVICE — GUIDEWIRE URO L150CM DIA .035IN STIFF NIT HYDRPHL STR TIP

## (undated) DEVICE — ADAPTER URO SCP UROLOK LL

## (undated) DEVICE — FIBER LSR 365UM HOLM

## (undated) DEVICE — DUAL LUMEN URETERAL CATHETER

## (undated) DEVICE — TOWEL,OR,DSP,ST,NATURAL,DLX,4/PK,20PK/CS: Brand: MEDLINE

## (undated) DEVICE — SINGLE ACTION PUMPING SYSTEM

## (undated) DEVICE — TUBING, SUCTION, 3/16" X 10', STRAIGHT: Brand: MEDLINE

## (undated) DEVICE — SYSTEM PMP SGL ACT W/ 10CC VAC SYR 1 W VLV FOR ENDOSCP SURG

## (undated) DEVICE — MASTISOL ADHESIVE AMPULE